# Patient Record
Sex: FEMALE | Race: WHITE | Employment: FULL TIME | URBAN - METROPOLITAN AREA
[De-identification: names, ages, dates, MRNs, and addresses within clinical notes are randomized per-mention and may not be internally consistent; named-entity substitution may affect disease eponyms.]

---

## 2017-11-28 ENCOUNTER — HOSPITAL ENCOUNTER (OUTPATIENT)
Dept: MAMMOGRAPHY | Age: 62
Discharge: HOME | End: 2017-11-28
Attending: OBSTETRICS & GYNECOLOGY
Payer: COMMERCIAL

## 2017-11-28 DIAGNOSIS — Z12.31 ENCOUNTER FOR SCREENING MAMMOGRAM FOR MALIGNANT NEOPLASM OF BREAST: ICD-10-CM

## 2017-11-28 PROCEDURE — 77067 SCR MAMMO BI INCL CAD: CPT

## 2018-12-07 ENCOUNTER — HOSPITAL ENCOUNTER (OUTPATIENT)
Dept: MAMMOGRAPHY | Age: 63
Discharge: HOME | End: 2018-12-07
Attending: OBSTETRICS & GYNECOLOGY
Payer: COMMERCIAL

## 2018-12-07 DIAGNOSIS — R92.2 DENSE BREAST TISSUE ON MAMMOGRAM: ICD-10-CM

## 2018-12-07 DIAGNOSIS — Z12.39 SCREENING FOR BREAST CANCER: ICD-10-CM

## 2018-12-07 PROCEDURE — 77063 BREAST TOMOSYNTHESIS BI: CPT

## 2020-01-14 ENCOUNTER — HOSPITAL ENCOUNTER (OUTPATIENT)
Dept: MAMMOGRAPHY | Age: 65
Discharge: HOME | End: 2020-01-14
Attending: OBSTETRICS & GYNECOLOGY
Payer: COMMERCIAL

## 2020-01-14 DIAGNOSIS — Z12.31 VISIT FOR SCREENING MAMMOGRAM: ICD-10-CM

## 2020-01-14 PROCEDURE — 77063 BREAST TOMOSYNTHESIS BI: CPT

## 2021-01-18 ENCOUNTER — TRANSCRIBE ORDER (OUTPATIENT)
Dept: SCHEDULING | Age: 66
End: 2021-01-18

## 2021-01-18 DIAGNOSIS — Z12.31 SCREENING MAMMOGRAM, ENCOUNTER FOR: Primary | ICD-10-CM

## 2021-02-22 ENCOUNTER — APPOINTMENT (RX ONLY)
Dept: URBAN - METROPOLITAN AREA CLINIC 349 | Facility: CLINIC | Age: 66
Setting detail: DERMATOLOGY
End: 2021-02-22

## 2021-02-22 DIAGNOSIS — D485 NEOPLASM OF UNCERTAIN BEHAVIOR OF SKIN: ICD-10-CM

## 2021-02-22 DIAGNOSIS — L82.1 OTHER SEBORRHEIC KERATOSIS: ICD-10-CM

## 2021-02-22 DIAGNOSIS — L20.89 OTHER ATOPIC DERMATITIS: ICD-10-CM

## 2021-02-22 DIAGNOSIS — D22 MELANOCYTIC NEVI: ICD-10-CM

## 2021-02-22 DIAGNOSIS — D18.0 HEMANGIOMA: ICD-10-CM

## 2021-02-22 DIAGNOSIS — Z71.89 OTHER SPECIFIED COUNSELING: ICD-10-CM

## 2021-02-22 DIAGNOSIS — L81.4 OTHER MELANIN HYPERPIGMENTATION: ICD-10-CM

## 2021-02-22 PROBLEM — D22.5 MELANOCYTIC NEVI OF TRUNK: Status: ACTIVE | Noted: 2021-02-22

## 2021-02-22 PROBLEM — D22.61 MELANOCYTIC NEVI OF RIGHT UPPER LIMB, INCLUDING SHOULDER: Status: ACTIVE | Noted: 2021-02-22

## 2021-02-22 PROBLEM — D18.01 HEMANGIOMA OF SKIN AND SUBCUTANEOUS TISSUE: Status: ACTIVE | Noted: 2021-02-22

## 2021-02-22 PROBLEM — D22.71 MELANOCYTIC NEVI OF RIGHT LOWER LIMB, INCLUDING HIP: Status: ACTIVE | Noted: 2021-02-22

## 2021-02-22 PROBLEM — D22.72 MELANOCYTIC NEVI OF LEFT LOWER LIMB, INCLUDING HIP: Status: ACTIVE | Noted: 2021-02-22

## 2021-02-22 PROBLEM — D48.5 NEOPLASM OF UNCERTAIN BEHAVIOR OF SKIN: Status: ACTIVE | Noted: 2021-02-22

## 2021-02-22 PROBLEM — L20.84 INTRINSIC (ALLERGIC) ECZEMA: Status: ACTIVE | Noted: 2021-02-22

## 2021-02-22 PROCEDURE — ? EDUCATIONAL RESOURCES PROVIDED

## 2021-02-22 PROCEDURE — ? COUNSELING

## 2021-02-22 PROCEDURE — A4550 SURGICAL TRAYS: HCPCS

## 2021-02-22 PROCEDURE — 11102 TANGNTL BX SKIN SINGLE LES: CPT

## 2021-02-22 PROCEDURE — ? BIOPSY BY SHAVE METHOD

## 2021-02-22 PROCEDURE — 99243 OFF/OP CNSLTJ NEW/EST LOW 30: CPT | Mod: 25

## 2021-02-22 PROCEDURE — ? OTHER

## 2021-02-22 PROCEDURE — ? RECOMMENDATIONS

## 2021-02-22 ASSESSMENT — LOCATION SIMPLE DESCRIPTION DERM
LOCATION SIMPLE: RIGHT UPPER ARM
LOCATION SIMPLE: LEFT THIGH
LOCATION SIMPLE: RIGHT CALF
LOCATION SIMPLE: CHEST
LOCATION SIMPLE: LOWER BACK
LOCATION SIMPLE: ABDOMEN
LOCATION SIMPLE: UPPER BACK
LOCATION SIMPLE: LEFT UPPER ARM
LOCATION SIMPLE: LEFT PRETIBIAL REGION
LOCATION SIMPLE: RIGHT THIGH
LOCATION SIMPLE: RIGHT SCALP
LOCATION SIMPLE: LEFT UPPER BACK

## 2021-02-22 ASSESSMENT — LOCATION DETAILED DESCRIPTION DERM
LOCATION DETAILED: LEFT PROXIMAL PRETIBIAL REGION
LOCATION DETAILED: LEFT ANTERIOR PROXIMAL UPPER ARM
LOCATION DETAILED: UPPER STERNUM
LOCATION DETAILED: PERIUMBILICAL SKIN
LOCATION DETAILED: SUPERIOR LUMBAR SPINE
LOCATION DETAILED: RIGHT ANTERIOR PROXIMAL THIGH
LOCATION DETAILED: RIGHT PROXIMAL CALF
LOCATION DETAILED: SUPERIOR THORACIC SPINE
LOCATION DETAILED: LEFT ANTERIOR DISTAL THIGH
LOCATION DETAILED: LEFT MID-UPPER BACK
LOCATION DETAILED: LEFT SUPERIOR MEDIAL UPPER BACK
LOCATION DETAILED: EPIGASTRIC SKIN
LOCATION DETAILED: RIGHT LATERAL SUPERIOR CHEST
LOCATION DETAILED: RIGHT ANTERIOR PROXIMAL UPPER ARM
LOCATION DETAILED: LEFT INFERIOR MEDIAL UPPER BACK
LOCATION DETAILED: RIGHT MEDIAL FRONTAL SCALP

## 2021-02-22 ASSESSMENT — LOCATION ZONE DERM
LOCATION ZONE: LEG
LOCATION ZONE: ARM
LOCATION ZONE: TRUNK
LOCATION ZONE: SCALP

## 2021-02-22 NOTE — HPI: SKIN LESION
How Severe Is Your Skin Lesion?: mild
Has Your Skin Lesion Been Treated?: not been treated
Is This A New Presentation, Or A Follow-Up?: Skin Lesion
Additional History: Patient is here for a skin check. She has a lesion on her forehead that feels scaly but she denies any growing, bleeding or changing. Patient denies family or personal history of melanoma.

## 2021-02-22 NOTE — PROCEDURE: BIOPSY BY SHAVE METHOD
Consent: Written consent was obtained and risks were reviewed including but not limited to scarring, infection, bleeding, scabbing, incomplete removal, nerve damage and allergy to anesthesia.
Bill 54365 For Specimen Handling/Conveyance To Laboratory?: no
Detail Level: Detailed
Dressing: pressure dressing with telfa
Information: Selecting Yes will display possible errors in your note based on the variables you have selected. This validation is only offered as a suggestion for you. PLEASE NOTE THAT THE VALIDATION TEXT WILL BE REMOVED WHEN YOU FINALIZE YOUR NOTE. IF YOU WANT TO FAX A PRELIMINARY NOTE YOU WILL NEED TO TOGGLE THIS TO 'NO' IF YOU DO NOT WANT IT IN YOUR FAXED NOTE.
Anesthesia Volume In Cc (Will Not Render If 0): 0.7
Curettage Text: The wound bed was treated with curettage after the biopsy was performed.
Validate Note Data (See Information Below): Yes
Cryotherapy Text: The wound bed was treated with cryotherapy after the biopsy was performed.
Notification Instructions: Patient will be notified of biopsy results. However, patient instructed to call the office if not contacted within 2 weeks.
Electrodesiccation And Curettage Text: The wound bed was treated with electrodesiccation and curettage after the biopsy was performed.
Accession #: global
Silver Nitrate Text: The wound bed was treated with silver nitrate after the biopsy was performed.
Path Notes (To The Dermatopathologist): Partial shave
Billing Type: Third-Party Bill
Biopsy Type: H and E
Hemostasis: Electrodesiccation
Depth Of Biopsy: dermis
Size Of Lesion In Cm: 0
Electrodesiccation Text: The wound bed was treated with electrodesiccation after the biopsy was performed.
Post-Care Instructions: I reviewed with the patient in detail post-care instructions. Patient is to keep the biopsy site dry overnight, and then apply bacitracin twice daily until healed. Patient may apply hydrogen peroxide soaks to remove any crusting.
Wound Care: Vaseline
Biopsy Method: Dermablade
Anesthesia Type: 2% lidocaine with epinephrine
Type Of Destruction Used: Curettage

## 2021-02-22 NOTE — PROCEDURE: OTHER
Detail Level: Zone
Note Text (......Xxx Chief Complaint.): This diagnosis correlates with the
Render Risk Assessment In Note?: yes
Other (Free Text): Advised to call if it fails to go away in the next few months. Patient is very dry. Doesn’t bother her. Recommended dove bar soap

## 2021-02-24 ENCOUNTER — HOSPITAL ENCOUNTER (OUTPATIENT)
Dept: MAMMOGRAPHY | Age: 66
Discharge: HOME | End: 2021-02-24
Attending: OBSTETRICS & GYNECOLOGY
Payer: COMMERCIAL

## 2021-02-24 DIAGNOSIS — Z12.31 SCREENING MAMMOGRAM, ENCOUNTER FOR: ICD-10-CM

## 2021-02-24 PROCEDURE — 77067 SCR MAMMO BI INCL CAD: CPT

## 2021-03-01 ENCOUNTER — RX ONLY (OUTPATIENT)
Age: 66
Setting detail: RX ONLY
End: 2021-03-01

## 2021-03-01 RX ORDER — CLOBETASOL PROPIONATE 0.5 MG/ML
SOLUTION TOPICAL
Qty: 1 | Refills: 1 | Status: ERX | COMMUNITY
Start: 2021-03-01

## 2021-03-03 ENCOUNTER — HOSPITAL ENCOUNTER (OUTPATIENT)
Dept: MAMMOGRAPHY | Age: 66
Discharge: HOME | End: 2021-03-03
Attending: OBSTETRICS & GYNECOLOGY
Payer: COMMERCIAL

## 2021-03-03 DIAGNOSIS — R92.8 ABNORMAL SCREENING MAMMOGRAM: ICD-10-CM

## 2021-03-03 PROCEDURE — 76642 ULTRASOUND BREAST LIMITED: CPT

## 2021-03-03 PROCEDURE — 77061 BREAST TOMOSYNTHESIS UNI: CPT

## 2021-03-15 ENCOUNTER — HOSPITAL ENCOUNTER (OUTPATIENT)
Dept: MRI IMAGING | Age: 66
Discharge: HOME | End: 2021-03-15
Attending: OBSTETRICS & GYNECOLOGY
Payer: COMMERCIAL

## 2021-03-15 DIAGNOSIS — R92.8 ABNORMAL MAMMOGRAM: ICD-10-CM

## 2021-03-15 DIAGNOSIS — R92.8 ABNORMAL ULTRASOUND OF BREAST: ICD-10-CM

## 2021-03-15 DIAGNOSIS — Z80.3 FAMILY HISTORY OF BREAST CANCER: ICD-10-CM

## 2021-03-15 PROCEDURE — 77049 MRI BREAST C-+ W/CAD BI: CPT

## 2021-03-15 PROCEDURE — A9575 INJ GADOTERATE MEGLUMI 0.1ML: HCPCS | Performed by: OBSTETRICS & GYNECOLOGY

## 2021-03-15 PROCEDURE — 74011000258 HC RX REV CODE- 258: Performed by: OBSTETRICS & GYNECOLOGY

## 2021-03-15 PROCEDURE — 74011250636 HC RX REV CODE- 250/636: Performed by: OBSTETRICS & GYNECOLOGY

## 2021-03-15 RX ORDER — GADOTERATE MEGLUMINE 376.9 MG/ML
18 INJECTION INTRAVENOUS
Status: COMPLETED | OUTPATIENT
Start: 2021-03-15 | End: 2021-03-15

## 2021-03-15 RX ORDER — SODIUM CHLORIDE 0.9 % (FLUSH) 0.9 %
10 SYRINGE (ML) INJECTION
Status: COMPLETED | OUTPATIENT
Start: 2021-03-15 | End: 2021-03-15

## 2021-03-15 RX ADMIN — SODIUM CHLORIDE 100 ML: 900 INJECTION, SOLUTION INTRAVENOUS at 11:57

## 2021-03-15 RX ADMIN — GADOTERATE MEGLUMINE 18 ML: 376.9 INJECTION INTRAVENOUS at 11:57

## 2021-03-15 RX ADMIN — Medication 10 ML: at 11:57

## 2021-03-18 ENCOUNTER — HOSPITAL ENCOUNTER (OUTPATIENT)
Dept: MAMMOGRAPHY | Age: 66
Discharge: HOME | End: 2021-03-18
Attending: OBSTETRICS & GYNECOLOGY
Payer: COMMERCIAL

## 2021-03-18 VITALS
SYSTOLIC BLOOD PRESSURE: 112 MMHG | HEART RATE: 79 BPM | TEMPERATURE: 96.6 F | DIASTOLIC BLOOD PRESSURE: 56 MMHG | OXYGEN SATURATION: 97 %

## 2021-03-18 DIAGNOSIS — R92.8 ABNORMAL MRI, BREAST: ICD-10-CM

## 2021-03-18 PROCEDURE — 74011000250 HC RX REV CODE- 250: Performed by: OBSTETRICS & GYNECOLOGY

## 2021-03-18 PROCEDURE — 77030022584 MAM STEREO VAC  BX BREAST LT 1ST LESION W/CLIP AND SPECIMEN

## 2021-03-18 PROCEDURE — 77065 DX MAMMO INCL CAD UNI: CPT

## 2021-03-18 PROCEDURE — 74011250636 HC RX REV CODE- 250/636: Performed by: OBSTETRICS & GYNECOLOGY

## 2021-03-18 PROCEDURE — A4648 IMPLANTABLE TISSUE MARKER: HCPCS

## 2021-03-18 PROCEDURE — 88305 TISSUE EXAM BY PATHOLOGIST: CPT

## 2021-03-18 RX ORDER — LIDOCAINE HYDROCHLORIDE AND EPINEPHRINE 10; 10 MG/ML; UG/ML
10 INJECTION, SOLUTION INFILTRATION; PERINEURAL
Status: COMPLETED | OUTPATIENT
Start: 2021-03-18 | End: 2021-03-18

## 2021-03-18 RX ORDER — LIDOCAINE HYDROCHLORIDE 10 MG/ML
5 INJECTION INFILTRATION; PERINEURAL
Status: COMPLETED | OUTPATIENT
Start: 2021-03-18 | End: 2021-03-18

## 2021-03-18 RX ADMIN — LIDOCAINE HYDROCHLORIDE,EPINEPHRINE BITARTRATE 2 ML: 10; .01 INJECTION, SOLUTION INFILTRATION; PERINEURAL at 11:03

## 2021-03-18 RX ADMIN — LIDOCAINE HYDROCHLORIDE 5 ML: 10 INJECTION, SOLUTION INFILTRATION; PERINEURAL at 11:03

## 2021-03-18 RX ADMIN — SODIUM CHLORIDE 250 ML: 900 INJECTION, SOLUTION INTRAVENOUS at 11:03

## 2021-03-28 PROBLEM — C50.912 MALIGNANT NEOPLASM OF LEFT BREAST IN FEMALE, ESTROGEN RECEPTOR POSITIVE (HCC): Status: ACTIVE | Noted: 2021-03-28

## 2021-03-28 PROBLEM — Z17.0 MALIGNANT NEOPLASM OF LEFT BREAST IN FEMALE, ESTROGEN RECEPTOR POSITIVE (HCC): Status: ACTIVE | Noted: 2021-03-28

## 2021-03-28 NOTE — H&P (VIEW-ONLY)
[address] [facility], [address] [facility] 
[phone] History and Physical  
Abdi Rosales    Admit date: (Not on file) MRN: [id number]     : 1955     Age: 63 y.o. Attending Physician: Dimitri Sosa MD 
 
HPI: Abdi Rosales is a 63 y.o. female who was found to have a new lesion on mammogram, left breast in retroareolar location. Pt has no prior breast history There is no significant family history of breast cancer. Pt denies any new findings on self exam 
 
 
Documentation checklist  (yes/no/na) 1. Was patient presented at interdisciplinary breast conference pre-op? Yes 2. For mastectomy patients---Was plastic surgery consultation offered/obtained pre-op  No pt will have lumpectomy 3. Is patient having sentinel node excision? (If no, and pt had invasive carcinoma, please explain)? yes 4. Was pt referred for genetic counseling?   no 
 
5. What was the genetic risk screening score? <10 Family History 10 pts 1st degree relative or 2 second degree relatives meeting above criteria (on the same side of the family) 10 pts 3rd degree relative with breast cancer and 2 or more close blood relatives with breast cancer (1-< or = 50 yrs) 9 pts  Close male relative with breast cancer at any age 10 pts Family member with known BRCA1 or BRCA2 mutation 7 pts Close relative with ovarian cancer at any age 7 pts Ashkenazi Buddhist ancestry 7 pts  Specific  populations ([place], American [place], [place]) Definitions: 
1st degree relative: parent, sister/brother, child 2nd degree relative:  grandparent, aunt/uncle, half-sibling, niece/nephew 3rd degree relative: great grandparent, great-uncle/aunt, cousin Close relative: first, second, or 3rd degree relative Past Medical History:  
Diagnosis Date  Abnormal Pap smear  Chicken pox  Cyst of breast   
 Left and right breast cysts.  Measles  Mumps No current outpatient medications on file. No current facility-administered medications for this visit. ALERGIES:  Patient has no known allergies. Social  
Social History Tobacco Use  Smoking status: Never Smoker  Smokeless tobacco: Never Used Substance Use Topics  Alcohol use: Yes Alcohol/week: 1.0 standard drinks Types: 1 Glasses of wine per week Comment: social  
 Drug use: No  
 
Family History Problem Relation Age of Onset  Breast Cancer Mother 62  Osteoporosis Other  Diabetes Maternal Grandfather  Heart Disease Paternal Grandfather  Hypertension Paternal Grandfather  Colon Cancer Neg Hx   
 Ovarian Cancer Neg Hx Past Surgical History:  
Procedure Laterality Date  CKC, AKA COLD KNIFE CONE    
 around 2003  HX BREAST BIOPSY Left 03/18/2021  
 left retroareolar assymetry ROS: The patient has no difficulty with chest pain or shortness of breath. No fever or chills. Comprehensive review of systems was otherwise unremarkable except as noted above. Physical Exam:  
General: Alert oriented cooperative patient in no acute distress. Eyes:Sclera are clear. Resp: No JVD. Breathing is  non-labored. CV: RRR. Breast:normal physical exam  
Axillary: negative for enlarged nodes Abd: soft non-tender and non-distended without peritoneal signs. Extremities: unremarkable. Neuro:  No focal signs CBC:  
Lab Results Component Value Date/Time Hemoglobin (POC) 13.5 10/23/2019 01:02 PM  
 
BMP: No results found for: NA, K, CL, CO2, AGAP, GLU, BUN, CREA, BUCR, GFRAA, GFRNA, CA, GFRAA  
 
[facility] Results (most recent): 
Results from Hospital Encounter encounter on 03/18/21 PATRICIA POST BX IMAGING LT INCL CAD Narrative ADDENDUM 3/24/2021: 
 
Pathology was noted as A: Left retroareolar architectural distortion, 3 cm from 
nipple, core biopsy: Infiltrating duct carcinoma with lobular features, low 
grade (well differentiated).   Focal lobular carcinoma in situ. Definite 
lymphovascular invasion is not identified. Results concordant with imaging. Pathology report and recommendation were 
called to  Dr. Mathieu Buitrago office on 3/19/2021 by BRIAN Hernandez. Patient was referred to Dr. Nikhil Gastelum and has an appointment scheduled 
with him on 3/25/2021. Follow-up breast MRI is also recommended in 12 months 
(see below). LEFT BREAST STEREOTACTIC VACUUM-ASSISTED CORE NEEDLE BIOPSY AND MARKER CLIP 
PLACEMENT,  
LEFT DIAGNOSTIC DIGITAL MAMMOGRAPHY: 
 
CLINICAL HISTORY:  Followup abnormal mammogram and MRI for histologic diagnosis 
in a 24-year-old with a strong family history of breast cancer give her a 
persistent 27% lifetime breast cancer risk. BIOPSY AND CLIP PLACEMENT:  Written informed consent was obtained. Focal 
architectural distortion at the retroareolar position 3.5 cm from the nipple 
confirmed at mammography on March 3, 2021 and shown to be associated with a 1 cm 
multilobular enhancing mass by MRI on March 15, 2021 was localized with 
craniocaudal compression from above on the Hologic prone stereotactic biopsy 
table. Using standard aseptic technique and 1% Xylocaine local anesthesia, a 
8-gauge Mammotome Revolve vacuum-assisted core biopsy needle was positioned 
adjacent to the architectural distortion, and 8 core samples were obtained. A 
titanium marker clip was placed through the cannula, and deployment was 
confirmed by repeat stereotactic imaging. POST-BIOPSY LEFT MAMMOGRAM:  Craniocaudal and straight lateral views demonstrate 
the biopsy marker clip in expected position. The patient tolerated the procedure 
well without immediate complication and left the department in good condition. Impression 1. Successful core needle biopsy and marker clip placement for architectural 
distortion at the retroareolar position.  
 
2. If histology fails to confirm malignancy, then followup MRI is recommended in 6-12 months per the percutaneous core needle biopsy protocol. 3.  If histology confirms malignancy, then follow-up breast MRI is again 
recommended in 12 months for follow-up of extensive bilateral background 
parenchymal enhancement. Thank you for referral of this patient. MRI Results (most recent): 
Results from Hospital Encounter encounter on 03/15/21 MRI BREAST BI W WO CONT Narrative BREAST MRI BEFORE AND AFTER CONTRAST CLINICAL HISTORY:  Follow-up indeterminate mammography and ultrasound in a 
28-year-old with dense breast parenchyma and a strong family history of breast 
cancer giving her a persistent lifetime risk of 27% by the Tyrer-Cuzick model. TECHNIQUE: Standard axial and sagittal images were obtained before and during 
bolus injection of 18 cc of Dotarem IV. CAD was employed. COMPARISON:  Bilateral 3-D screening mammogram of February 24, 2021, left 3-D 
additional views and ultrasound of March 3, 2021, and earlier studies. FINDINGS: There is extensive background parenchymal enhancement bilaterally, 
which limits MRI detection of possible small malignant foci. There are multiple 
small bilateral cysts suggestive of fibrocystic mastopathy. There is 1.0 cm 
multilobular enhancing mass with suspicious kinetics corresponding to the focus 
of left retroareolar architectural distortion approximately 3.5 cm posterior to 
the nipple. No pathologically enlarged or dysmorphic lymph nodes are seen, 
accounting for mammographic stability since 2015 of a 3 cm fatty-replaced left 
axillary lymph node with lobular inferior cortical thickening. The liver, 
lungs, and chest wall appear unremarkable as imaged. Impression 1. A 1 CM MULTILOBULAR RETROAREOLAR MASS WITH WORRISOME KINETICS ASSOCIATED 
WITH MAMMOGRAPHIC ARCHITECTURAL DISTORTION IS SUSPICIOUS FOR MALIGNANCY, AND 
STEREOTACTIC BIOPSY IS RECOMMENDED (IN THE ABSENCE OF A SONOGRAPHIC CORRELATE).   
 
 
2.  NO MR EVIDENCE OF RIGHT BREAST MALIGNANCY OR OF METASTATIC DISEASE IN THE IMAGED CHEST. 2.  EXTENSIVE BILATERAL BACKGROUND PARENCHYMAL ENHANCEMENT FOR  WHICH FOLLOW-UP 
BREAST MRI IS RECOMMENDED IN ONE YEAR FOR THIS 63YEAR-OLD WITH DENSE, COMPLEX 
BREAST PARENCHYMA AND A PERSISTENT 27% LIFETIME BREAST CANCER RISK (SEE ABOVE). LEFT: BI-RADS Assessment Category 4: Suspicious Finding- Biopsy should be 
considered. BILATERAL BACKGROUND PARENCHYMAL ENHANCEMENT: BI-RADS Assessment Category 3: 
Probably benign- Short-interval follow-up suggested (one year). Preliminary results were reported by Nelson Carbone to [facility] in Dr. Zavala Shown 
office at on March 16, 2021 09:08 hours. The patient has been scheduled for 
left breast biopsy with stereotactic guidance on March 18, 2020 at 10:15 hours. PET Results (most recent): No results found for this or any previous visit. Assessment/Plan: Grace Genao is a 63 y.o. female who has newly diagnosed left breast cancer. We discussed in detail the findings on imaging and pathology. We discussed treatment options including surgery, oncology and radiation therapy. I recommended the following Wire localized left breast lumpectomy and sentinel lymph node biopsy I spent 40 minutes in the care of the Grace Genao today, over 50% of which was in direct counseling and coordination of care. Patient to be presented at [facility] breast conference.   
 
Signed:  Neo Porter MD 
3/58/6090  1:54 PM

## 2021-04-08 VITALS — HEIGHT: 68 IN | WEIGHT: 190 LBS | BODY MASS INDEX: 28.79 KG/M2

## 2021-04-08 NOTE — PERIOP NOTES
Patient verified name and . Order for consent found in EHR and matches case posting; patient verifies procedure. Type 1B surgery, PAT PHONE assessment complete. Orders received. Labs per surgeon: none. Labs per anesthesia protocol: none. Patient COVID test date 21 at [phone]; patient is aware of appointment. The testing center is located at the [address], [place]. If appointment is needed-patient provided telephone number of [phone]. Patient answered medical/surgical history questions at their best of ability. All prior to admission medications documented in [facility]. Patient instructed to take the following medications the day of surgery according to anesthesia guidelines with a small sip of water: NONE. Hold all vitamins, supplements, herbals 7 days prior to surgery and NSAIDS/ASA 5 days prior to surgery. Patient instructed on the following:      > No deodorant  > Arrive at [facility], time of arrival to be called the day before by 1700  > NPO after midnight including gum, mints, and ice chips  > Responsible adult must drive patient to the hospital, stay during surgery, and patient will need supervision 24 hours after anesthesia  > Use anti-bacterial soap in shower the night before surgery and on the morning of surgery  > All piercings must be removed prior to arrival.    > Leave all valuables (money and jewelry) at home but bring insurance card and ID on DOS.   > Do not wear make-up, nail polish, lotions, cologne, perfumes, powders, or oil on skin.

## 2021-04-09 ENCOUNTER — HOSPITAL ENCOUNTER (OUTPATIENT)
Dept: SURGERY | Age: 66
Discharge: HOME | End: 2021-04-09

## 2021-04-15 ENCOUNTER — ANESTHESIA EVENT (OUTPATIENT)
Dept: SURGERY | Age: 66
End: 2021-04-15
Payer: COMMERCIAL

## 2021-04-16 ENCOUNTER — APPOINTMENT (OUTPATIENT)
Dept: MAMMOGRAPHY | Age: 66
End: 2021-04-16
Attending: OBSTETRICS & GYNECOLOGY
Payer: COMMERCIAL

## 2021-04-16 ENCOUNTER — APPOINTMENT (OUTPATIENT)
Dept: MAMMOGRAPHY | Age: 66
End: 2021-04-16
Attending: SURGERY
Payer: COMMERCIAL

## 2021-04-16 ENCOUNTER — ANESTHESIA (OUTPATIENT)
Dept: SURGERY | Age: 66
End: 2021-04-16
Payer: COMMERCIAL

## 2021-04-16 ENCOUNTER — HOSPITAL ENCOUNTER (OUTPATIENT)
Age: 66
Setting detail: OUTPATIENT SURGERY
Discharge: HOME | End: 2021-04-16
Attending: SURGERY | Admitting: SURGERY
Payer: COMMERCIAL

## 2021-04-16 ENCOUNTER — APPOINTMENT (OUTPATIENT)
Dept: NUCLEAR MEDICINE | Age: 66
End: 2021-04-16
Attending: SURGERY
Payer: COMMERCIAL

## 2021-04-16 VITALS
DIASTOLIC BLOOD PRESSURE: 64 MMHG | BODY MASS INDEX: 28.65 KG/M2 | SYSTOLIC BLOOD PRESSURE: 111 MMHG | TEMPERATURE: 97.9 F | OXYGEN SATURATION: 95 % | HEART RATE: 69 BPM | WEIGHT: 188.4 LBS | RESPIRATION RATE: 16 BRPM

## 2021-04-16 DIAGNOSIS — Z17.0 MALIGNANT NEOPLASM OF LEFT BREAST IN FEMALE, ESTROGEN RECEPTOR POSITIVE, UNSPECIFIED SITE OF BREAST (HCC): Primary | ICD-10-CM

## 2021-04-16 DIAGNOSIS — C50.912 MALIGNANT NEOPLASM OF LEFT BREAST IN FEMALE, ESTROGEN RECEPTOR POSITIVE, UNSPECIFIED SITE OF BREAST (HCC): Primary | ICD-10-CM

## 2021-04-16 PROCEDURE — 77030040361 HC SLV COMPR DVT MDII -B: Performed by: SURGERY

## 2021-04-16 PROCEDURE — 77030031139 HC SUT VCRL2 J&J -A: Performed by: SURGERY

## 2021-04-16 PROCEDURE — 74011250636 HC RX REV CODE- 250/636: Performed by: NURSE ANESTHETIST, CERTIFIED REGISTERED

## 2021-04-16 PROCEDURE — 77030010512 HC APPL CLP LIG J&J -C: Performed by: SURGERY

## 2021-04-16 PROCEDURE — 2709999900 HC NON-CHARGEABLE SUPPLY: Performed by: SURGERY

## 2021-04-16 PROCEDURE — 76060000034 HC ANESTHESIA 1.5 TO 2 HR: Performed by: SURGERY

## 2021-04-16 PROCEDURE — 76210000020 HC REC RM PH II FIRST 0.5 HR: Performed by: SURGERY

## 2021-04-16 PROCEDURE — 74011000250 HC RX REV CODE- 250: Performed by: SURGERY

## 2021-04-16 PROCEDURE — 76010000162 HC OR TIME 1.5 TO 2 HR INTENSV-TIER 1: Performed by: SURGERY

## 2021-04-16 PROCEDURE — 77030010509 HC AIRWY LMA MSK TELE -A: Performed by: NURSE ANESTHETIST, CERTIFIED REGISTERED

## 2021-04-16 PROCEDURE — 74011000250 HC RX REV CODE- 250: Performed by: NURSE ANESTHETIST, CERTIFIED REGISTERED

## 2021-04-16 PROCEDURE — A9541 TC99M SULFUR COLLOID: HCPCS

## 2021-04-16 PROCEDURE — 74011250637 HC RX REV CODE- 250/637: Performed by: ANESTHESIOLOGY

## 2021-04-16 PROCEDURE — 88305 TISSUE EXAM BY PATHOLOGIST: CPT

## 2021-04-16 PROCEDURE — 74011250636 HC RX REV CODE- 250/636: Performed by: ANESTHESIOLOGY

## 2021-04-16 PROCEDURE — 77030002933 HC SUT MCRYL J&J -A: Performed by: SURGERY

## 2021-04-16 PROCEDURE — 38525 BIOPSY/REMOVAL LYMPH NODES: CPT | Performed by: SURGERY

## 2021-04-16 PROCEDURE — 77030002996 HC SUT SLK J&J -A: Performed by: SURGERY

## 2021-04-16 PROCEDURE — 88307 TISSUE EXAM BY PATHOLOGIST: CPT

## 2021-04-16 PROCEDURE — 19281 PERQ DEVICE BREAST 1ST IMAG: CPT

## 2021-04-16 PROCEDURE — 76210000006 HC OR PH I REC 0.5 TO 1 HR: Performed by: SURGERY

## 2021-04-16 PROCEDURE — 19301 PARTIAL MASTECTOMY: CPT | Performed by: SURGERY

## 2021-04-16 PROCEDURE — 74011250636 HC RX REV CODE- 250/636: Performed by: SURGERY

## 2021-04-16 RX ORDER — SODIUM CHLORIDE 0.9 % (FLUSH) 0.9 %
5-40 SYRINGE (ML) INJECTION AS NEEDED
Status: DISCONTINUED | OUTPATIENT
Start: 2021-04-16 | End: 2021-04-16 | Stop reason: HOSPADM

## 2021-04-16 RX ORDER — LIDOCAINE HYDROCHLORIDE 10 MG/ML
0.1 INJECTION INFILTRATION; PERINEURAL AS NEEDED
Status: DISCONTINUED | OUTPATIENT
Start: 2021-04-16 | End: 2021-04-16 | Stop reason: HOSPADM

## 2021-04-16 RX ORDER — NALOXONE HYDROCHLORIDE 0.4 MG/ML
0.2 INJECTION, SOLUTION INTRAMUSCULAR; INTRAVENOUS; SUBCUTANEOUS AS NEEDED
Status: DISCONTINUED | OUTPATIENT
Start: 2021-04-16 | End: 2021-04-16 | Stop reason: HOSPADM

## 2021-04-16 RX ORDER — SODIUM CHLORIDE 0.9 % (FLUSH) 0.9 %
5-40 SYRINGE (ML) INJECTION EVERY 8 HOURS
Status: DISCONTINUED | OUTPATIENT
Start: 2021-04-16 | End: 2021-04-16 | Stop reason: HOSPADM

## 2021-04-16 RX ORDER — EPHEDRINE SULFATE/0.9% NACL/PF 50 MG/5 ML
SYRINGE (ML) INTRAVENOUS AS NEEDED
Status: DISCONTINUED | OUTPATIENT
Start: 2021-04-16 | End: 2021-04-16 | Stop reason: HOSPADM

## 2021-04-16 RX ORDER — SODIUM CHLORIDE, SODIUM LACTATE, POTASSIUM CHLORIDE, CALCIUM CHLORIDE 600; 310; 30; 20 MG/100ML; MG/100ML; MG/100ML; MG/100ML
100 INJECTION, SOLUTION INTRAVENOUS CONTINUOUS
Status: DISCONTINUED | OUTPATIENT
Start: 2021-04-16 | End: 2021-04-16 | Stop reason: HOSPADM

## 2021-04-16 RX ORDER — FENTANYL CITRATE 50 UG/ML
100 INJECTION, SOLUTION INTRAMUSCULAR; INTRAVENOUS ONCE
Status: DISCONTINUED | OUTPATIENT
Start: 2021-04-16 | End: 2021-04-16 | Stop reason: HOSPADM

## 2021-04-16 RX ORDER — TRAMADOL HYDROCHLORIDE 50 MG/1
50 TABLET ORAL
Qty: 28 TAB | Refills: 0 | Status: SHIPPED | OUTPATIENT
Start: 2021-04-16 | End: 2021-04-23

## 2021-04-16 RX ORDER — ONDANSETRON 2 MG/ML
INJECTION INTRAMUSCULAR; INTRAVENOUS AS NEEDED
Status: DISCONTINUED | OUTPATIENT
Start: 2021-04-16 | End: 2021-04-16 | Stop reason: HOSPADM

## 2021-04-16 RX ORDER — DEXAMETHASONE SODIUM PHOSPHATE 4 MG/ML
INJECTION, SOLUTION INTRA-ARTICULAR; INTRALESIONAL; INTRAMUSCULAR; INTRAVENOUS; SOFT TISSUE AS NEEDED
Status: DISCONTINUED | OUTPATIENT
Start: 2021-04-16 | End: 2021-04-16 | Stop reason: HOSPADM

## 2021-04-16 RX ORDER — KETOROLAC TROMETHAMINE 30 MG/ML
INJECTION, SOLUTION INTRAMUSCULAR; INTRAVENOUS AS NEEDED
Status: DISCONTINUED | OUTPATIENT
Start: 2021-04-16 | End: 2021-04-16 | Stop reason: HOSPADM

## 2021-04-16 RX ORDER — MIDAZOLAM HYDROCHLORIDE 1 MG/ML
2 INJECTION, SOLUTION INTRAMUSCULAR; INTRAVENOUS
Status: DISCONTINUED | OUTPATIENT
Start: 2021-04-16 | End: 2021-04-16 | Stop reason: HOSPADM

## 2021-04-16 RX ORDER — FENTANYL CITRATE 50 UG/ML
INJECTION, SOLUTION INTRAMUSCULAR; INTRAVENOUS AS NEEDED
Status: DISCONTINUED | OUTPATIENT
Start: 2021-04-16 | End: 2021-04-16 | Stop reason: HOSPADM

## 2021-04-16 RX ORDER — FLUMAZENIL 0.1 MG/ML
0.2 INJECTION INTRAVENOUS
Status: DISCONTINUED | OUTPATIENT
Start: 2021-04-16 | End: 2021-04-16 | Stop reason: HOSPADM

## 2021-04-16 RX ORDER — PROPOFOL 10 MG/ML
INJECTION, EMULSION INTRAVENOUS AS NEEDED
Status: DISCONTINUED | OUTPATIENT
Start: 2021-04-16 | End: 2021-04-16 | Stop reason: HOSPADM

## 2021-04-16 RX ORDER — LIDOCAINE HYDROCHLORIDE 10 MG/ML
INJECTION INFILTRATION; PERINEURAL AS NEEDED
Status: DISCONTINUED | OUTPATIENT
Start: 2021-04-16 | End: 2021-04-16 | Stop reason: HOSPADM

## 2021-04-16 RX ORDER — LIDOCAINE HYDROCHLORIDE 10 MG/ML
5 INJECTION INFILTRATION; PERINEURAL
Status: COMPLETED | OUTPATIENT
Start: 2021-04-16 | End: 2021-04-16

## 2021-04-16 RX ORDER — OXYCODONE HYDROCHLORIDE 5 MG/1
10 TABLET ORAL
Status: DISCONTINUED | OUTPATIENT
Start: 2021-04-16 | End: 2021-04-16 | Stop reason: HOSPADM

## 2021-04-16 RX ORDER — OXYCODONE HYDROCHLORIDE 5 MG/1
5 TABLET ORAL
Status: COMPLETED | OUTPATIENT
Start: 2021-04-16 | End: 2021-04-16

## 2021-04-16 RX ORDER — DIPHENHYDRAMINE HYDROCHLORIDE 50 MG/ML
12.5 INJECTION, SOLUTION INTRAMUSCULAR; INTRAVENOUS
Status: DISCONTINUED | OUTPATIENT
Start: 2021-04-16 | End: 2021-04-16 | Stop reason: HOSPADM

## 2021-04-16 RX ORDER — ACETAMINOPHEN 500 MG
1000 TABLET ORAL ONCE
Status: DISCONTINUED | OUTPATIENT
Start: 2021-04-16 | End: 2021-04-16 | Stop reason: HOSPADM

## 2021-04-16 RX ORDER — MIDAZOLAM HYDROCHLORIDE 1 MG/ML
2 INJECTION, SOLUTION INTRAMUSCULAR; INTRAVENOUS ONCE
Status: COMPLETED | OUTPATIENT
Start: 2021-04-16 | End: 2021-04-16

## 2021-04-16 RX ORDER — HYDROMORPHONE HYDROCHLORIDE 2 MG/ML
0.5 INJECTION, SOLUTION INTRAMUSCULAR; INTRAVENOUS; SUBCUTANEOUS
Status: DISCONTINUED | OUTPATIENT
Start: 2021-04-16 | End: 2021-04-16 | Stop reason: HOSPADM

## 2021-04-16 RX ORDER — CEFAZOLIN SODIUM/WATER 2 G/20 ML
2 SYRINGE (ML) INTRAVENOUS ONCE
Status: COMPLETED | OUTPATIENT
Start: 2021-04-16 | End: 2021-04-16

## 2021-04-16 RX ORDER — LIDOCAINE HYDROCHLORIDE 20 MG/ML
INJECTION, SOLUTION EPIDURAL; INFILTRATION; INTRACAUDAL; PERINEURAL AS NEEDED
Status: DISCONTINUED | OUTPATIENT
Start: 2021-04-16 | End: 2021-04-16 | Stop reason: HOSPADM

## 2021-04-16 RX ADMIN — KETOROLAC TROMETHAMINE 15 MG: 30 INJECTION, SOLUTION INTRAMUSCULAR at 14:11

## 2021-04-16 RX ADMIN — OXYCODONE HYDROCHLORIDE 5 MG: 5 TABLET ORAL at 15:22

## 2021-04-16 RX ADMIN — FENTANYL CITRATE 25 MCG: 50 INJECTION INTRAMUSCULAR; INTRAVENOUS at 14:08

## 2021-04-16 RX ADMIN — LIDOCAINE HYDROCHLORIDE 40 MG: 20 INJECTION, SOLUTION EPIDURAL; INFILTRATION; INTRACAUDAL; PERINEURAL at 13:29

## 2021-04-16 RX ADMIN — DEXAMETHASONE SODIUM PHOSPHATE 4 MG: 4 INJECTION, SOLUTION INTRAMUSCULAR; INTRAVENOUS at 13:36

## 2021-04-16 RX ADMIN — MIDAZOLAM HYDROCHLORIDE 2 MG: 1 INJECTION, SOLUTION INTRAMUSCULAR; INTRAVENOUS at 12:29

## 2021-04-16 RX ADMIN — ONDANSETRON 4 MG: 2 INJECTION INTRAMUSCULAR; INTRAVENOUS at 13:36

## 2021-04-16 RX ADMIN — SODIUM CHLORIDE, POTASSIUM CHLORIDE, SODIUM LACTATE AND CALCIUM CHLORIDE 100 ML/HR: 600; 310; 30; 20 INJECTION, SOLUTION INTRAVENOUS at 12:31

## 2021-04-16 RX ADMIN — CEFAZOLIN 2 G: 1 INJECTION, POWDER, FOR SOLUTION INTRAVENOUS at 13:11

## 2021-04-16 RX ADMIN — FENTANYL CITRATE 25 MCG: 50 INJECTION INTRAMUSCULAR; INTRAVENOUS at 13:36

## 2021-04-16 RX ADMIN — Medication 5 MG: at 13:43

## 2021-04-16 RX ADMIN — PROPOFOL 150 MG: 10 INJECTION, EMULSION INTRAVENOUS at 13:29

## 2021-04-16 RX ADMIN — FENTANYL CITRATE 25 MCG: 50 INJECTION INTRAMUSCULAR; INTRAVENOUS at 13:32

## 2021-04-16 RX ADMIN — FENTANYL CITRATE 25 MCG: 50 INJECTION INTRAMUSCULAR; INTRAVENOUS at 13:48

## 2021-04-16 RX ADMIN — LIDOCAINE HYDROCHLORIDE 4 ML: 10 INJECTION, SOLUTION INFILTRATION; PERINEURAL at 11:38

## 2021-04-16 NOTE — ANESTHESIA PREPROCEDURE EVALUATION
Relevant Problems   PERSONAL HX & FAMILY HX OF CANCER   (+) Malignant neoplasm of left breast in female, estrogen receptor positive (HCC)       Anesthetic History   No history of anesthetic complications            Review of Systems / Medical History  Patient summary reviewed and pertinent labs reviewed    Pulmonary  Within defined limits                 Neuro/Psych   Within defined limits           Cardiovascular                  Exercise tolerance: >4 METS     GI/Hepatic/Renal  Within defined limits              Endo/Other  Within defined limits           Other Findings              Physical Exam    Airway  Mallampati: I  TM Distance: 4 - 6 cm  Neck ROM: normal range of motion   Mouth opening: Normal     Cardiovascular    Rhythm: regular  Rate: normal         Dental         Pulmonary  Breath sounds clear to auscultation               Abdominal         Other Findings            Anesthetic Plan    ASA: 1  Anesthesia type: general          Induction: Intravenous  Anesthetic plan and risks discussed with: Patient

## 2021-04-16 NOTE — ANESTHESIA POSTPROCEDURE EVALUATION
Procedure(s):  LEFT BREAST NEEDLE LOCALIZED LUMPECTOMY  LEFT SENTINEL NODE BIOPSY WITH LYMPHATIC MAPPING.     general    Anesthesia Post Evaluation      Multimodal analgesia: multimodal analgesia used between 6 hours prior to anesthesia start to PACU discharge  Patient location during evaluation: PACU  Patient participation: complete - patient participated  Level of consciousness: awake and alert  Pain management: adequate  Airway patency: patent  Anesthetic complications: no  Cardiovascular status: acceptable  Respiratory status: acceptable  Hydration status: acceptable  Post anesthesia nausea and vomiting:  controlled  Final Post Anesthesia Temperature Assessment:  Normothermia (36.0-37.5 degrees C)      INITIAL Post-op Vital signs:   Vitals Value Taken Time   /64 04/16/21 1525   Temp 36.6 °C (97.9 °F) 04/16/21 1448   Pulse 69 04/16/21 1525   Resp 16 04/16/21 1525   SpO2 95 % 04/16/21 1525

## 2021-04-16 NOTE — DISCHARGE INSTRUCTIONS
May shower on Sunday     Ice packs as needed for comfort     Ultram or tylenol as needed for pain       Patient Education        [place] Node Biopsy: What to Expect at [address] [place] [address]  After a sentinel node biopsy, many people have no side effects. Some people have pain or bruising at the cut (incision) and feel tired. Your breast and underarm area may be slightly swollen. This may last a few days. You should feel close to normal in a few days. The incision the doctor made usually heals in about 2 weeks. The scar usually fades with time. Some people have a buildup of fluid in the area where the lymph nodes were removed. This is known as seroma. This goes away on its own, or your doctor can drain it. When you had this test, your doctor injected blue dye or radioactive material (or both) into your breast. The blue dye may give your breast a bluish color and turn your urine green for about 24 hours. The radioactive material leaves the body on its own in 24 to 48 hours. A sentinel node biopsy may be done at the same time as other breast surgeries. If this is the case, how you recover will be different. This care sheet gives you a general idea about how long it will take for you to recover. But each person recovers at a different pace. Follow the steps below to get better as quickly as possible. How can you care for yourself at home? Activity    · Rest when you feel tired. Getting enough sleep will help you recover.     · Try to walk each day. Start by walking a little more than you did the day before. Bit by bit, increase the amount you walk. Walking boosts blood flow and helps prevent pneumonia and constipation.     · You may drive when you are no longer taking pain medicine and you feel up to it.     · You can lift things when you feel comfortable doing so.     · Most women return to work and their normal routines in 2 to 7 days.     · You may shower 24 to 48 hours after surgery, if your doctor okays it. Pat the incision dry. Do not take a bath for the first 2 weeks, or until your doctor tells you it is okay.     · Avoid activity or exercise that may put stress on the cut. This includes washing windows, vacuuming, or gardening with the affected arm. Diet    · You can eat your normal diet. If your stomach is upset, try bland, low-fat foods like plain rice, broiled chicken, toast, and yogurt.     · You may notice that your bowels are not regular right after your surgery. This is common. Try to avoid constipation and straining with bowel movements. Take a fiber supplement such as Citrucel or Metamucil every day. If you have not had a bowel movement after a couple of days, take a mild laxative. Medicines    · Your doctor will tell you if and when you can restart your medicines. He or she will also give you instructions about taking any new medicines.     · If you take aspirin or some other blood thinner, ask your doctor if and when to start taking it again. Make sure that you understand exactly what your doctor wants you to do.     · Take pain medicines exactly as directed. ? If the doctor gave you a prescription medicine for pain, take it as prescribed. ? If you are not taking a prescription pain medicine, take an over-the-counter medicine such as acetaminophen (Tylenol), ibuprofen (Advil, Motrin), or naproxen (Aleve). Read and follow all instructions on the label. ? Do not take two or more pain medicines at the same time unless the doctor told you to. Many pain medicines have acetaminophen, which is Tylenol. Too much acetaminophen (Tylenol) can be harmful.     · If your doctor prescribed antibiotics, take them as directed. Do not stop taking them just because you feel better. You need to take the full course of antibiotics.     · If you think your pain medicine is making you sick to your stomach:  ? Take your medicine after meals (unless your doctor has told you not to). ?  Ask your doctor for a different pain medicine. Incision care    · If you have strips of tape on the cut (incision) the doctor made, leave the tape on for about 1 week or until it falls off.     · After you can shower, wash the area daily with warm, soapy water and pat it dry. Follow-up care is a key part of your treatment and safety. Be sure to make and go to all appointments, and call your doctor if you are having problems. It's also a good idea to know your test results and keep a list of the medicines you take. When should you call for help? Call 911 anytime you think you may need emergency care. For example, call if:    · You passed out (lost consciousness).     · You have chest pain, are short of breath, or cough up blood. Call your doctor now or seek immediate medical care if:    · You have pain that does not get better after you take pain medicine.     · You cannot pass stools or gas.     · You are sick to your stomach or cannot drink fluids.     · You have signs of a blood clot in your leg (called a deep vein thrombosis), such as:  ? Pain in your calf, back of the knee, thigh, or groin. ? Redness or swelling in your leg.     · You have signs of infection, such as:  ? Increased pain, swelling, warmth, or redness. ? Red streaks leading from the incision. ? Pus draining from the incision. ? A fever.     · You have loose stitches, or your incision comes open.     · Bright red blood has soaked through the bandage over your incision. Watch closely for changes in your health, and be sure to contact your doctor if:    · You have any problems.     · You have new or worse swelling or pain in your arm. Where can you learn more? Go to http://www.[employer].com/  Enter H004 in the search box to learn more about \"[place] Node Biopsy: What to Expect at Home. \"  Current as of: December 17, 2020               Content Version: 12.8  © 7141-4031 [employer]wise, Incorporated.    Care instructions adapted under license by [employer] (which disclaims liability or warranty for this information). If you have questions about a medical condition or this instruction, always ask your healthcare professional. [address] any warranty or liability for your use of this information.

## 2021-04-16 NOTE — INTERVAL H&P NOTE
Update History & Physical 
 
The Patient's History and Physical of March 25, 2021 was reviewed with the patient and I examined the patient. There was no change. The surgical site was confirmed by the patient and me. Plan:  The risk, benefits, expected outcome, and alternative to the recommended procedure have been discussed with the patient. Patient understands and wants to proceed with the procedure.  
 
Electronically signed by Jenene Cheadle, MD on 0/45/2126 at 1:06 PM

## 2021-04-18 ENCOUNTER — HOSPITAL ENCOUNTER (EMERGENCY)
Age: 66
Discharge: HOME | End: 2021-04-18
Attending: EMERGENCY MEDICINE
Payer: COMMERCIAL

## 2021-04-18 VITALS
DIASTOLIC BLOOD PRESSURE: 63 MMHG | HEIGHT: 68 IN | RESPIRATION RATE: 16 BRPM | SYSTOLIC BLOOD PRESSURE: 123 MMHG | TEMPERATURE: 98.1 F | WEIGHT: 195 LBS | OXYGEN SATURATION: 99 % | BODY MASS INDEX: 29.55 KG/M2 | HEART RATE: 58 BPM

## 2021-04-18 DIAGNOSIS — L76.82 OTHER POSTOPERATIVE COMPLICATION OF SKIN: Primary | ICD-10-CM

## 2021-04-18 PROCEDURE — 99283 EMERGENCY DEPT VISIT LOW MDM: CPT

## 2021-04-18 NOTE — ED NOTES
I have reviewed discharge instructions with the patient. The patient verbalized understanding. Patient left ED via Discharge Method: ambulatory to Home with   Opportunity for questions and clarification provided. Patient given 0 scripts. To continue your aftercare when you leave the hospital, you may receive an automated call from our care team to check in on how you are doing. This is a free service and part of our promise to provide the best care and service to meet your aftercare needs.  If you have questions, or wish to unsubscribe from this service please call [phone]. Thank you for Choosing our [facility] Emergency Department.

## 2021-04-18 NOTE — ED PROVIDER NOTES
64 y/o f to ed with her  at side with concern for bleeding this am. She had left breast lumpectomy on Friday (2 days ago) and woke this am with blood on sheet and a quarter sized clot to her dressing on her breast.  No other complains           Past Medical History:   Diagnosis Date    Abnormal Pap smear     Cancer of left breast (HCC)     Chicken pox     Cyst of breast     Left and right breast cysts.  Measles     Mumps        Past Surgical History:   Procedure Laterality Date    CKC, AKA COLD KNIFE CONE      around 2003    HX BREAST BIOPSY Left 03/18/2021    left retroareolar assymetry    HX BREAST BIOPSY Left 4/16/2021    LEFT BREAST NEEDLE LOCALIZED LUMPECTOMY performed by Froylan Uriostegui MD at [facility]         Family History:   Problem Relation Age of Onset   Gentry Pearson Breast Cancer Mother 62    Osteoporosis Other     Diabetes Maternal Grandfather     Heart Disease Paternal Grandfather     Hypertension Paternal Grandfather     Colon Cancer Neg Hx     Ovarian Cancer Neg Hx        Social History     Socioeconomic History    Marital status:      Spouse name: Not on file    Number of children: Not on file    Years of education: Not on file    Highest education level: Not on file   Occupational History    Not on file   Social Needs    Financial resource strain: Not on file    Food insecurity     Worry: Not on file     Inability: Not on file    Transportation needs     Medical: Not on file     Non-medical: Not on file   Tobacco Use    Smoking status: Never Smoker    Smokeless tobacco: Never Used   Substance and Sexual Activity    Alcohol use:  Yes     Alcohol/week: 1.0 standard drinks     Types: 1 Glasses of wine per week     Comment: social    Drug use: Never    Sexual activity: Yes     Partners: Male     Birth control/protection: Surgical     Comment: vasectomy   Lifestyle    Physical activity     Days per week: Not on file     Minutes per session: Not on file    Stress: Not on file   Relationships    Social connections     Talks on phone: Not on file     Gets together: Not on file     Attends Buddhism service: Not on file     Active member of club or organization: Not on file     Attends meetings of clubs or organizations: Not on file     Relationship status: Not on file    Intimate partner violence     Fear of current or ex partner: Not on file     Emotionally abused: Not on file     Physically abused: Not on file     Forced sexual activity: Not on file   Other Topics Concern    Not on file   Social History Narrative    Not on file         ALLERGIES: Patient has no known allergies. Review of Systems   Skin: Positive for wound. All other systems reviewed and are negative. Vitals:    04/18/21 0913   BP: 123/63   Pulse: (!) 58   Resp: 16   Temp: 98.1 °F (36.7 °C)   SpO2: 99%   Weight: 88.5 kg (195 lb)   Height: 5' 8\" (1.727 m)            Physical Exam  Constitutional:       Appearance: Normal appearance. HENT:      Head: Normocephalic and atraumatic. Nose: Nose normal.      Mouth/Throat:      Mouth: Mucous membranes are moist.   Eyes:      Pupils: Pupils are equal, round, and reactive to light. Chest:       Musculoskeletal:         General: Tenderness present. Skin:     General: Skin is warm and dry. Neurological:      General: No focal deficit present. Mental Status: She is alert. Psychiatric:         Mood and Affect: Mood normal.         Thought Content: Thought content normal.         Judgment: Judgment normal.          MDM  Number of Diagnoses or Management Options  Diagnosis management comments: Pt tells me she was laying on her side - I suspect she popped a stitch. Area cleaned, new steri strips placed. Reassurance given.   To follow with her surgeon on Monday  (rosanne lucero and kevin lucero chaperones)    Risk of Complications, Morbidity, and/or Mortality  Presenting problems: minimal  Diagnostic procedures: minimal  Management options: minimal    Patient Progress  Patient progress: stable         Procedures

## 2021-04-18 NOTE — ED TRIAGE NOTES
Ambulatory to triage without difficulty. Pt states she had a lumpectomy of the left breast by Dr Ewa Mensah on Friday and woke up this morning with blood on the sheets. Took bandage off of breast and saw a quarter sized clot on the bandage. Denies fevers.

## 2021-04-19 NOTE — PROGRESS NOTES
ER on 4/18 for bleeding through incision. Patient states she may have popped a stitch. ER discharged patient home with new bandage. No issues since.

## 2021-04-23 NOTE — OP NOTES
[address] [address]  OPERATIVE REPORT    Name:  Dudley Vasques  MR#:  [id number]  :  1955  ACCOUNT #:  [de-identified]  DATE OF SERVICE:  2021    PREOPERATIVE DIAGNOSIS:  Left breast cancer. POSTOPERATIVE DIAGNOSIS:  Left breast cancer. PROCEDURE PERFORMED:  Wide localized left-sided lumpectomy and sentinel lymph node biopsy, left axilla with lymphatic mapping. SURGEON:  Radha Rey MD    ASSISTANT:  None. ANESTHESIA:  General anesthetic. COMPLICATIONS:  None. SPECIMENS REMOVED:  Left lumpectomy, left sentinel lymph node biopsy. IMPLANTS:  none. ESTIMATED BLOOD LOSS:  10 mL. CONDITION AT COMPLETION:  Stable. INDICATIONS:  This patient is a 79-year-old female with newly diagnosed left-sided breast cancer. The risks, benefits and alternatives to surgical option were discussed in detail with the patient prior to surgery. She understood the risks and wished to proceed. Appropriate consent was given. PROCEDURE:  The patient underwent wire localization of the biopsy clip of the left breast in the Radiology Suite. The patient underwent nuclear medicine lymphatic mapping prior to surgery. She was then taken to the operating room, where general endotracheal anesthetic was performed without difficulty. The breast and axilla were prepped and draped in routine sterile fashion. A time-out was performed identifying the patient, procedure and lateralities. Lumpectomy was performed first.  A small skin incision was made following the skin lines. A longer dissection down the localization wire was performed to the appropriate site. Here, generous lumpectomy was taken around the specimen to include the localization wire and the biopsy clip at its center. Once completed, specimen was marked for orientation with a long suture lateral and a short suture superior, double suture deep.   Specimen radiograph was performed immediately, which showed what appeared to be a close deep margin. Therefore, a second separate deep margin was taken, another centimeter of tissue was taken past the point of the wire and the specimen was labeled with a suture marked in the new surgical margin. Irrigation of the cavity was performed. Hemostasis was assured. The incision was closed in two layers with interrupted 3-0 Vicryl and 4-0 subcutaneous Monocryl. Attention was turned to the axilla, where the Neoprobe was used to localize the sentinel node. Small skin incision was made overlying the increased uptake and dissection to the axillary contents was performed carefully with electrocautery. Once axillary contents were entered, blunt dissection was performed to identify the sentinel node, which was dissected free and removed intact and sent to Pathology for further review. Hemostasis was assured. Scanning of the axilla after the node removal showed very little to no uptake remaining. The incision was closed in two layers with 3-0 and 4-0 subcutaneous Monocryl. Steri-Strip dressings were applied to both incisions. The patient tolerated the procedure well with no complications. She was awakened and extubated, and taken to recovery in good condition. After extubation, instrument and lap counts were correct x2 at completion.       MD FARRAH [email or web address]  D:  04/22/2021 12:44  T:  04/23/2021 0:20  JOB #:  [id number]

## 2021-05-05 ENCOUNTER — PATIENT OUTREACH (OUTPATIENT)
Dept: CASE MANAGEMENT | Age: 66
End: 2021-05-05

## 2021-05-05 NOTE — PROGRESS NOTES
5/5/2021 saw the patient with Dr Yamilet Feldman for her initial oncology visit regarding breast  Cancer. She will not need chemotherapy but Dr Yamilet Feldman discussed with her that she would need radiation and hormone blocking pill. I have given her printed information regarding arimidex. Navigation information given to the patient.

## 2021-05-11 ENCOUNTER — HOSPITAL ENCOUNTER (OUTPATIENT)
Dept: RADIATION ONCOLOGY | Age: 66
Discharge: HOME | End: 2021-05-11
Payer: COMMERCIAL

## 2021-05-11 VITALS
BODY MASS INDEX: 30.08 KG/M2 | SYSTOLIC BLOOD PRESSURE: 113 MMHG | WEIGHT: 194.9 LBS | DIASTOLIC BLOOD PRESSURE: 72 MMHG | HEART RATE: 66 BPM | OXYGEN SATURATION: 95 % | TEMPERATURE: 98 F

## 2021-05-11 PROCEDURE — 99211 OFF/OP EST MAY X REQ PHY/QHP: CPT

## 2021-05-11 NOTE — NURSE NAVIGATOR
Consult left breast cancer. Lumpectomy 21. F/u Dr. Katharina Lugo 21. Plan is RT then Arimidex. Pt denies pain. Spouse present for consult. Pt is  3/ Para 3. Started menses at age 17. Pt is post menopausal.  No history of Oral contraceptive use or HRT. CONSENTS SIGNED FOR RT LEFT BREAST.   CT [facility] SCHEDULED Tuesday, 21 AT 10 AM.  APT GIVEN TO PT.  SKIN CARE SHEET GIVEN AND EXPLAINED TO PT.     Law Kohli RN

## 2021-05-11 NOTE — PROGRESS NOTES
Patient: Aubree Jones MRN: [id number]  SSN: [id number]    YOB: 1955  Age: 63 y.o. Sex: female      Other Providers:  Abnormal screening mammogram    CHIEF COMPLAINT: Dr. Pepe Dimas, Dr. 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 female who I am seeing at the request of Dr. Pepe Dimas. Ms. Bradley was in her usual state of health until 2/24/2021 at which time she presented for routine screening mammogram. This demonstrated an area of possible architectural distortion in the lower, medial left breast at 3.5cm from the nipple. A diagnostic mammogram and ultrasound was performed 3/3/2021. On mammogram there was a small architectural distortion in the anterior left breast, directly behind the nipple. There was no suspicious sonographic correlate. Bilateral MRI was performed 3/15/2021 which demonstrated a 1cm multilobular retroareolar mass with worrisome features. There was no evidence of right breast malignancy. Given extensive bilateral parenchymal enhancement, follow up breast MRI in one year was recommended. A biopsy was performed 3/18/2021 which demonstrated infiltrating ductal carcinoma with lobular features, low grade. Focal lobular carcinoma in situ was present, definite LVI was not identified. The lesion was ER positive (93%), KS positive (82%), HER2/desire negative (1+). On 4/16/2021, she underwent a left breast lumpectomy demonstrating invasive ductal carcinoma with lobular features, low grade, measuring 8mm in greatest dimension. The posterior margin was initially positive for invasive carcinoma, final deep medial margin was negative. One left sentinel lymph node was negative for metastatic carcinoma. She has met with Dr. Pepe Dimas, who recommended adjuvant endocrine therapy alone.     She is recovering from a recent cold but otherwise has good energy level. She does have some mild itching but denies pain, swelling, and tenderness in the left breast. She has had no recent fevers or chills. PAST MEDICAL HISTORY:    Past Medical History:   Diagnosis Date    Abnormal Pap smear     Cancer of left breast ([address]     Chicken pox     Cyst of breast     Left and right breast cysts.  Measles     Mumps        The patient denies history of collagen vascular diseases, pacemaker insertion, prior radiation or prior chemotherapy. PAST SURGICAL HISTORY:   Past Surgical History:   Procedure Laterality Date    CKC, AKA COLD KNIFE CONE      around 2003    HX BREAST BIOPSY Left 03/18/2021    left retroareolar assymetry    HX BREAST BIOPSY Left 4/16/2021    LEFT BREAST NEEDLE LOCALIZED LUMPECTOMY performed by Lorin Sood MD at [address]:     Current Outpatient Medications:     anastrozole (Arimidex) 1 mg tablet, Take 1 mg by mouth daily. , Disp: 30 Tab, Rfl: 5    ALLERGIES:   No Known Allergies    SOCIAL HISTORY:   Social History     Socioeconomic History    Marital status:      Spouse name: Not on file    Number of children: Not on file    Years of education: Not on file    Highest education level: Not on file   Occupational History    Not on file   Social Needs    Financial resource strain: Not on file    Food insecurity     Worry: Not on file     Inability: Not on file    Transportation needs     Medical: Not on file     Non-medical: Not on file   Tobacco Use    Smoking status: Never Smoker    Smokeless tobacco: Never Used   Substance and Sexual Activity    Alcohol use:  Yes     Alcohol/week: 1.0 standard drinks     Types: 1 Glasses of wine per week     Comment: social    Drug use: Never    Sexual activity: Yes     Partners: Male     Birth control/protection: Surgical     Comment: vasectomy   Lifestyle    Physical activity     Days per week: Not on file     Minutes per session: Not on file    Stress: Not on file Relationships    Social connections     Talks on phone: Not on file     Gets together: Not on file     Attends Jain service: Not on file     Active member of club or organization: Not on file     Attends meetings of clubs or organizations: Not on file     Relationship status: Not on file    Intimate partner violence     Fear of current or ex partner: Not on file     Emotionally abused: Not on file     Physically abused: Not on file     Forced sexual activity: Not on file   Other Topics Concern    Not on file   Social History Narrative    Not on file       FAMILY HISTORY:   Family History   Problem Relation Age of Onset    Breast Cancer Mother 62    Osteoporosis Other     Diabetes Maternal Grandfather     Heart Disease Paternal Grandfather     Hypertension Paternal Grandfather     Colon Cancer Neg Hx     Ovarian Cancer Neg Hx        REVIEW OF SYSTEMS:   A full 12-point review of systems was completed and was negative unless noted in the history of present illness. PHYSICAL EXAMINATION:   ECOG Performance status 0  VITAL SIGNS:   Visit Vitals  /72 (BP 1 Location: Right upper arm, BP Patient Position: Sitting)   Pulse 66   Temp 98 °F (36.7 °C)   Wt 88.4 kg (194 lb 14.4 oz)   SpO2 95%   BMI 30.08 kg/m²       General: well developed/nourished adult Female in no acute distress; appears stated age  [de-identified]: normocephalic, atraumatic; EOMI  Neck: supple with full ROM; no cervical lymphadenopathy  Cardiovascular: normal S1 and S2, RRR, no murmurs  Respiratory: normal inspiratory effort, no audible wheezes  Extremities: no cyanosis, clubbing, or edema  Musculoskeletal: mobility intact x4; normal ROM in all joints  Skin: no skin lesions identified  Neuro: AOx3; sensation intact x 4; CNII-XII grossly intact  Psych: appropriate affect, insight, and judgement  GI: abdomen soft, non-distended  : not indicated   Breast: Breasts symmetric bilaterally without skin changes or nipple discharge.  Well healed left breast salbador areolar and axillary incisions with mild fluid collection in the lumpectomy cavity. Erythema medially due to scratching, not convincing for cellulitis. No palpable mass in the left breast. Right breast without palpable masses. PATHOLOGY:    I personally reviewed the pathology reports as documented in the HPI. LABORATORY: No results found for: NA, K, CL, CO2, AGAP, GLU, BUN, CREA, GFRAA, GFRNA, CA, MG, PHOS, ALB, TBIL, TP, ALB, GLOB, AGRAT, ALT  No results found for: WBC, HGB, HCT, PLT, HGBEXT, HCTEXT, PLTEXT    RADIOLOGY:    I personally reviewed the images and agree with the findings as documented in the HPI. IMPRESSION:  Niki Peck is a 63 y.o. female with left breast pT1bN0 invasive carcinoma with mixed ductal and lobular features now s/p lumpectomy presenting for discussion of radiation therapy. I had a long discussion with Niki Peck regarding treatment options, specifically adjuvant radiation therapy to the left breast. I discussed the use of whole breast radiation in order to minimize the risk of breast cancer recurrence and improve survival. Given her favorable features and age I do not feel a longer course of treatment with a boost would significantly improve her outcomes. I reviewed in detail the anticipated acute and late toxicities of radiation therapy as well as the logistics of treatment and expected disease control. Niki Peck and her family had the opportunity to ask questions which appeared to be answered to their satisfaction and have elected to proceed with treatment. PLAN:    1) Consented patient for treatment with external beam radiation after discussing risk, benefits, and side effects from treatment. 2) Reviewed available research treatment and cancer care protocols for which patient may be eligible. Unfortunately there are no matching clinical trials available at this time. 3) CT simulation to be scheduled.       Itz Cochran MD   May 11, 2021

## 2021-05-18 ENCOUNTER — HOSPITAL ENCOUNTER (OUTPATIENT)
Dept: RADIATION ONCOLOGY | Age: 66
Discharge: HOME | End: 2021-05-18
Payer: COMMERCIAL

## 2021-05-18 PROCEDURE — 77332 RADIATION TREATMENT AID(S): CPT

## 2021-05-18 PROCEDURE — 77290 THER RAD SIMULAJ FIELD CPLX: CPT

## 2021-05-20 ENCOUNTER — HOSPITAL ENCOUNTER (OUTPATIENT)
Dept: RADIATION ONCOLOGY | Age: 66
Discharge: HOME | End: 2021-05-20
Payer: COMMERCIAL

## 2021-05-20 PROCEDURE — 77295 3-D RADIOTHERAPY PLAN: CPT

## 2021-05-20 PROCEDURE — 77334 RADIATION TREATMENT AID(S): CPT

## 2021-05-20 PROCEDURE — 77300 RADIATION THERAPY DOSE PLAN: CPT

## 2021-05-22 ENCOUNTER — HOSPITAL ENCOUNTER (OUTPATIENT)
Dept: MAMMOGRAPHY | Age: 66
Discharge: HOME | End: 2021-05-22
Attending: INTERNAL MEDICINE
Payer: COMMERCIAL

## 2021-05-22 DIAGNOSIS — Z91.89 AT RISK FOR OSTEOPOROSIS: ICD-10-CM

## 2021-05-22 PROCEDURE — 77080 DXA BONE DENSITY AXIAL: CPT

## 2021-05-26 ENCOUNTER — HOSPITAL ENCOUNTER (OUTPATIENT)
Dept: RADIATION ONCOLOGY | Age: 66
Discharge: HOME | End: 2021-05-26
Payer: COMMERCIAL

## 2021-05-26 PROCEDURE — 77280 THER RAD SIMULAJ FIELD SMPL: CPT

## 2021-05-26 PROCEDURE — 77412 RADIATION TX DELIVERY LVL 3: CPT

## 2021-05-27 ENCOUNTER — HOSPITAL ENCOUNTER (OUTPATIENT)
Dept: RADIATION ONCOLOGY | Age: 66
Discharge: HOME | End: 2021-05-27
Payer: COMMERCIAL

## 2021-05-27 PROCEDURE — 77387 GUIDANCE FOR RADJ TX DLVR: CPT

## 2021-05-27 PROCEDURE — 77412 RADIATION TX DELIVERY LVL 3: CPT

## 2021-05-28 ENCOUNTER — HOSPITAL ENCOUNTER (OUTPATIENT)
Dept: RADIATION ONCOLOGY | Age: 66
Discharge: HOME | End: 2021-05-28
Payer: COMMERCIAL

## 2021-05-28 VITALS
DIASTOLIC BLOOD PRESSURE: 80 MMHG | WEIGHT: 195 LBS | BODY MASS INDEX: 30.09 KG/M2 | HEART RATE: 70 BPM | OXYGEN SATURATION: 99 % | TEMPERATURE: 97.9 F | SYSTOLIC BLOOD PRESSURE: 116 MMHG

## 2021-05-28 PROCEDURE — 77387 GUIDANCE FOR RADJ TX DLVR: CPT

## 2021-05-28 PROCEDURE — 77412 RADIATION TX DELIVERY LVL 3: CPT

## 2021-06-01 ENCOUNTER — HOSPITAL ENCOUNTER (OUTPATIENT)
Dept: RADIATION ONCOLOGY | Age: 66
Discharge: HOME | End: 2021-06-01
Payer: COMMERCIAL

## 2021-06-01 PROCEDURE — 77387 GUIDANCE FOR RADJ TX DLVR: CPT

## 2021-06-01 PROCEDURE — 77412 RADIATION TX DELIVERY LVL 3: CPT

## 2021-06-02 ENCOUNTER — HOSPITAL ENCOUNTER (OUTPATIENT)
Dept: RADIATION ONCOLOGY | Age: 66
Discharge: HOME | End: 2021-06-02
Payer: COMMERCIAL

## 2021-06-02 PROCEDURE — 77387 GUIDANCE FOR RADJ TX DLVR: CPT

## 2021-06-02 PROCEDURE — 77412 RADIATION TX DELIVERY LVL 3: CPT

## 2021-06-03 ENCOUNTER — HOSPITAL ENCOUNTER (OUTPATIENT)
Dept: RADIATION ONCOLOGY | Age: 66
Discharge: HOME | End: 2021-06-03
Payer: COMMERCIAL

## 2021-06-03 PROCEDURE — 77387 GUIDANCE FOR RADJ TX DLVR: CPT

## 2021-06-03 PROCEDURE — 77412 RADIATION TX DELIVERY LVL 3: CPT

## 2021-06-03 PROCEDURE — 77336 RADIATION PHYSICS CONSULT: CPT

## 2021-06-04 ENCOUNTER — HOSPITAL ENCOUNTER (OUTPATIENT)
Dept: RADIATION ONCOLOGY | Age: 66
Discharge: HOME | End: 2021-06-04
Payer: COMMERCIAL

## 2021-06-04 VITALS
HEART RATE: 58 BPM | BODY MASS INDEX: 30.51 KG/M2 | OXYGEN SATURATION: 98 % | DIASTOLIC BLOOD PRESSURE: 60 MMHG | SYSTOLIC BLOOD PRESSURE: 126 MMHG | TEMPERATURE: 98.6 F | WEIGHT: 197.7 LBS

## 2021-06-04 PROCEDURE — 77412 RADIATION TX DELIVERY LVL 3: CPT

## 2021-06-04 PROCEDURE — 77387 GUIDANCE FOR RADJ TX DLVR: CPT

## 2021-06-04 NOTE — PROGRESS NOTES
Patient: Milo Diamond MRN: [id number]  SSN: [id number]    YOB: 1955  Age: 63 y.o. 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 female being treated for left breast pT1bN0 invasive carcinoma with mixed ductal and lobular features. Week 1: No complaints. Week 2: No complaints. OBJECTIVE:  NAD  Visit Vitals  /60   Pulse (!) 58   Temp 98.6 °F (37 °C)   Wt 89.7 kg (197 lb 11.2 oz)   SpO2 98%   BMI 30.51 kg/m²       No results found for: NA, K, CL, CO2, AGAP, GLU, BUN, CREA, GFRAA, GFRNA, CA, MG, PHOS, ALB, TBIL, TP, ALB, GLOB, AGRAT, ALT  No results found for: WBC, HGB, HCT, PLT, HGBEXT, HCTEXT, PLTEXT, HGBEXT, HCTEXT, PLTEXT    ASSESSMENT and PLAN:  Milo Diamond is tolerating radiation as anticipated for the current dose and fraction. We will continue on as planned with another treatment visit anticipated next week.         Richard Rueda MD   June 4, 2021

## 2021-06-07 ENCOUNTER — HOSPITAL ENCOUNTER (OUTPATIENT)
Dept: RADIATION ONCOLOGY | Age: 66
Discharge: HOME | End: 2021-06-07
Payer: COMMERCIAL

## 2021-06-07 PROCEDURE — 77387 GUIDANCE FOR RADJ TX DLVR: CPT

## 2021-06-07 PROCEDURE — 77412 RADIATION TX DELIVERY LVL 3: CPT

## 2021-06-08 ENCOUNTER — HOSPITAL ENCOUNTER (OUTPATIENT)
Dept: RADIATION ONCOLOGY | Age: 66
Discharge: HOME | End: 2021-06-08
Payer: COMMERCIAL

## 2021-06-08 PROCEDURE — 77412 RADIATION TX DELIVERY LVL 3: CPT

## 2021-06-08 PROCEDURE — 77387 GUIDANCE FOR RADJ TX DLVR: CPT

## 2021-06-09 ENCOUNTER — HOSPITAL ENCOUNTER (OUTPATIENT)
Dept: RADIATION ONCOLOGY | Age: 66
Discharge: HOME | End: 2021-06-09
Payer: COMMERCIAL

## 2021-06-09 PROCEDURE — 77387 GUIDANCE FOR RADJ TX DLVR: CPT

## 2021-06-09 PROCEDURE — 77412 RADIATION TX DELIVERY LVL 3: CPT

## 2021-06-10 ENCOUNTER — HOSPITAL ENCOUNTER (OUTPATIENT)
Dept: RADIATION ONCOLOGY | Age: 66
Discharge: HOME | End: 2021-06-10
Payer: COMMERCIAL

## 2021-06-10 PROCEDURE — 77387 GUIDANCE FOR RADJ TX DLVR: CPT

## 2021-06-10 PROCEDURE — 77412 RADIATION TX DELIVERY LVL 3: CPT

## 2021-06-10 PROCEDURE — 77336 RADIATION PHYSICS CONSULT: CPT

## 2021-06-11 ENCOUNTER — HOSPITAL ENCOUNTER (OUTPATIENT)
Dept: RADIATION ONCOLOGY | Age: 66
Discharge: HOME | End: 2021-06-11
Payer: COMMERCIAL

## 2021-06-11 VITALS
WEIGHT: 196.2 LBS | HEART RATE: 63 BPM | SYSTOLIC BLOOD PRESSURE: 107 MMHG | TEMPERATURE: 97.8 F | DIASTOLIC BLOOD PRESSURE: 69 MMHG | OXYGEN SATURATION: 97 % | BODY MASS INDEX: 30.28 KG/M2

## 2021-06-11 DIAGNOSIS — Z17.0 MALIGNANT NEOPLASM OF CENTRAL PORTION OF LEFT BREAST IN FEMALE, ESTROGEN RECEPTOR POSITIVE (HCC): Primary | ICD-10-CM

## 2021-06-11 DIAGNOSIS — C50.112 MALIGNANT NEOPLASM OF CENTRAL PORTION OF LEFT BREAST IN FEMALE, ESTROGEN RECEPTOR POSITIVE (HCC): Primary | ICD-10-CM

## 2021-06-11 PROCEDURE — 77412 RADIATION TX DELIVERY LVL 3: CPT

## 2021-06-11 PROCEDURE — 77387 GUIDANCE FOR RADJ TX DLVR: CPT

## 2021-06-11 NOTE — PROGRESS NOTES
Patient: Howie Hernandez MRN: [id number]  SSN: [id number]    YOB: 1955  Age: 63 y.o. 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 female being treated for left breast pT1bN0 invasive carcinoma with mixed ductal and lobular features. Week 1: No complaints. Week 2: No complaints. Week 3: Mild skin irritation. OBJECTIVE:  NAD  There were no vitals taken for this visit. No results found for: NA, K, CL, CO2, AGAP, GLU, BUN, CREA, GFRAA, GFRNA, CA, MG, PHOS, ALB, TBIL, TP, ALB, GLOB, AGRAT, ALT  No results found for: WBC, HGB, HCT, PLT, HGBEXT, HCTEXT, PLTEXT, HGBEXT, HCTEXT, PLTEXT    ASSESSMENT and PLAN:  Howie Hernandez is tolerating radiation as anticipated for the current dose and fraction. She completes radiation therapy 6/16/21 and will return in 6 months with repeat mammogram.  - Aquaphor for skin reaction.        Radha Danielle MD   June 11, 2021

## 2021-06-14 ENCOUNTER — HOSPITAL ENCOUNTER (OUTPATIENT)
Dept: RADIATION ONCOLOGY | Age: 66
Discharge: HOME | End: 2021-06-14
Payer: COMMERCIAL

## 2021-06-14 PROCEDURE — 77387 GUIDANCE FOR RADJ TX DLVR: CPT

## 2021-06-14 PROCEDURE — 77412 RADIATION TX DELIVERY LVL 3: CPT

## 2021-06-15 ENCOUNTER — HOSPITAL ENCOUNTER (OUTPATIENT)
Dept: RADIATION ONCOLOGY | Age: 66
Discharge: HOME | End: 2021-06-15
Payer: COMMERCIAL

## 2021-06-15 PROCEDURE — 77387 GUIDANCE FOR RADJ TX DLVR: CPT

## 2021-06-15 PROCEDURE — 77412 RADIATION TX DELIVERY LVL 3: CPT

## 2021-06-16 ENCOUNTER — HOSPITAL ENCOUNTER (OUTPATIENT)
Dept: RADIATION ONCOLOGY | Age: 66
Discharge: HOME | End: 2021-06-16
Payer: COMMERCIAL

## 2021-06-16 PROCEDURE — 77412 RADIATION TX DELIVERY LVL 3: CPT

## 2021-06-16 PROCEDURE — 77387 GUIDANCE FOR RADJ TX DLVR: CPT

## 2021-06-17 ENCOUNTER — HOSPITAL ENCOUNTER (OUTPATIENT)
Dept: RADIATION ONCOLOGY | Age: 66
Discharge: HOME | End: 2021-06-17
Payer: COMMERCIAL

## 2021-07-12 ENCOUNTER — PATIENT OUTREACH (OUTPATIENT)
Dept: CASE MANAGEMENT | Age: 66
End: 2021-07-12

## 2021-07-12 ENCOUNTER — HOSPITAL ENCOUNTER (OUTPATIENT)
Dept: LAB | Age: 66
Discharge: HOME | End: 2021-07-12
Payer: COMMERCIAL

## 2021-07-12 DIAGNOSIS — C50.912 MALIGNANT NEOPLASM OF LEFT BREAST IN FEMALE, ESTROGEN RECEPTOR POSITIVE, UNSPECIFIED SITE OF BREAST (HCC): ICD-10-CM

## 2021-07-12 DIAGNOSIS — Z17.0 MALIGNANT NEOPLASM OF LEFT BREAST IN FEMALE, ESTROGEN RECEPTOR POSITIVE, UNSPECIFIED SITE OF BREAST (HCC): ICD-10-CM

## 2021-07-12 LAB
ALBUMIN SERPL-MCNC: 3.9 G/DL (ref 3.2–4.6)
ALBUMIN/GLOB SERPL: 1.3 {RATIO} (ref 1.2–3.5)
ALP SERPL-CCNC: 78 U/L (ref 50–136)
ALT SERPL-CCNC: 23 U/L (ref 12–65)
ANION GAP SERPL CALC-SCNC: 4 MMOL/L (ref 7–16)
AST SERPL-CCNC: 18 U/L (ref 15–37)
BASOPHILS # BLD: 0.1 K/UL (ref 0–0.2)
BASOPHILS NFR BLD: 1 % (ref 0–2)
BILIRUB SERPL-MCNC: 0.5 MG/DL (ref 0.2–1.1)
BUN SERPL-MCNC: 18 MG/DL (ref 8–23)
CALCIUM SERPL-MCNC: 8.8 MG/DL (ref 8.3–10.4)
CHLORIDE SERPL-SCNC: 111 MMOL/L (ref 98–107)
CO2 SERPL-SCNC: 25 MMOL/L (ref 21–32)
CREAT SERPL-MCNC: 0.7 MG/DL (ref 0.6–1)
DIFFERENTIAL METHOD BLD: NORMAL
EOSINOPHIL # BLD: 0.2 K/UL (ref 0–0.8)
EOSINOPHIL NFR BLD: 4 % (ref 0.5–7.8)
ERYTHROCYTE [DISTWIDTH] IN BLOOD BY AUTOMATED COUNT: 13.5 % (ref 11.9–14.6)
GLOBULIN SER CALC-MCNC: 3.1 G/DL (ref 2.3–3.5)
GLUCOSE SERPL-MCNC: 89 MG/DL (ref 65–100)
HCT VFR BLD AUTO: 40.8 % (ref 35.8–46.3)
HGB BLD-MCNC: 13.1 G/DL (ref 11.7–15.4)
IMM GRANULOCYTES # BLD AUTO: 0 K/UL (ref 0–0.5)
IMM GRANULOCYTES NFR BLD AUTO: 0 % (ref 0–5)
LYMPHOCYTES # BLD: 0.8 K/UL (ref 0.5–4.6)
LYMPHOCYTES NFR BLD: 16 % (ref 13–44)
MCH RBC QN AUTO: 28.7 PG (ref 26.1–32.9)
MCHC RBC AUTO-ENTMCNC: 32.1 G/DL (ref 31.4–35)
MCV RBC AUTO: 89.5 FL (ref 79.6–97.8)
MONOCYTES # BLD: 0.6 K/UL (ref 0.1–1.3)
MONOCYTES NFR BLD: 12 % (ref 4–12)
NEUTS SEG # BLD: 3.6 K/UL (ref 1.7–8.2)
NEUTS SEG NFR BLD: 68 % (ref 43–78)
NRBC # BLD: 0 K/UL (ref 0–0.2)
PLATELET # BLD AUTO: 183 K/UL (ref 150–450)
PMV BLD AUTO: 9.7 FL (ref 9.4–12.3)
POTASSIUM SERPL-SCNC: 4.1 MMOL/L (ref 3.5–5.1)
PROT SERPL-MCNC: 7 G/DL (ref 6.3–8.2)
RBC # BLD AUTO: 4.56 M/UL (ref 4.05–5.2)
SODIUM SERPL-SCNC: 140 MMOL/L (ref 136–145)
WBC # BLD AUTO: 5.3 K/UL (ref 4.3–11.1)

## 2021-07-12 PROCEDURE — 85025 COMPLETE CBC W/AUTO DIFF WBC: CPT

## 2021-07-12 PROCEDURE — 36415 COLL VENOUS BLD VENIPUNCTURE: CPT

## 2021-07-12 PROCEDURE — 80053 COMPREHEN METABOLIC PANEL: CPT

## 2021-07-12 NOTE — PROGRESS NOTES
7/12/2021 Saw the patient with Dr Tom Huertas. She has completed radiation. She has started the Arimidex. She is doing well on this. Her bone density is normal She will follow up in 6 months. We discussed the treatment plan and this will be mailed to her. Navigation will sign off.

## 2022-01-04 ENCOUNTER — HOSPITAL ENCOUNTER (OUTPATIENT)
Dept: MAMMOGRAPHY | Age: 67
Discharge: HOME | End: 2022-01-04
Attending: RADIOLOGY
Payer: COMMERCIAL

## 2022-01-04 DIAGNOSIS — C50.112 MALIGNANT NEOPLASM OF CENTRAL PORTION OF LEFT BREAST IN FEMALE, ESTROGEN RECEPTOR POSITIVE (HCC): ICD-10-CM

## 2022-01-04 DIAGNOSIS — Z17.0 MALIGNANT NEOPLASM OF CENTRAL PORTION OF LEFT BREAST IN FEMALE, ESTROGEN RECEPTOR POSITIVE (HCC): ICD-10-CM

## 2022-01-04 PROCEDURE — 77062 BREAST TOMOSYNTHESIS BI: CPT

## 2022-01-14 ENCOUNTER — HOSPITAL ENCOUNTER (OUTPATIENT)
Dept: RADIATION ONCOLOGY | Age: 67
Discharge: HOME | End: 2022-01-14
Payer: COMMERCIAL

## 2022-01-14 VITALS
TEMPERATURE: 98.2 F | WEIGHT: 204.6 LBS | OXYGEN SATURATION: 98 % | BODY MASS INDEX: 31.57 KG/M2 | HEART RATE: 59 BPM | SYSTOLIC BLOOD PRESSURE: 128 MMHG | DIASTOLIC BLOOD PRESSURE: 56 MMHG

## 2022-01-14 PROCEDURE — 99211 OFF/OP EST MAY X REQ PHY/QHP: CPT

## 2022-01-14 NOTE — PROGRESS NOTES
Pt is here today for FUP post RT to the left breast IDC which ended 6/2021. The 1/4/22 3D Mammogram was negative for cancer. Pt is taking Arimidex as ordered and is followed in Med Onc by Dr. Ace Aguila-- pt has FUP at his office on 1/21/22. It is to be determined when the next Breast MRI will be completed.

## 2022-01-14 NOTE — PROGRESS NOTES
Patient: Faith Tovar MRN: [id number]  SSN: [id number]    YOB: 1955  Age: 61 y.o. Sex: female      Other Providers:  Dr. 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 female who is here today for follow up,. She continues on Arimidex per medical oncology. tolerating well. No new breast concerns or changes. No difficulty with ROM. No difficulty with swallowing. Recovered well from therapy. No lingering skin changes related to radiation therapy. Recent mammogram negative except for treatment related changes. UPDATED PAST MEDICAL HISTORY:  Since our prior encounter, Faith Tovar has not been hospitalized. There have been no significant changes to the medical history. MEDICATIONS:     Current Outpatient Medications:     anastrozole (Arimidex) 1 mg tablet, Take 1 mg by mouth daily. , Disp: 30 Tablet, Rfl: 5    ALLERGIES:   No Known Allergies    PHYSICAL EXAMINATION:   ECOG Performance status 0  VITAL SIGNS:   Visit Vitals  BP (!) 128/56   Pulse (!) 59   Temp 98.2 °F (36.8 °C)   Wt 204 lb 9.6 oz (92.8 kg)   SpO2 98%   BMI 31.57 kg/m²        GENERAL: The patient is well-developed, ambulatory, alert and in no acute distress. HEENT: Head is normocephalic, atraumatic. Pupils are equal, round and reactive to light and accommodation. Extraocular movement intact. Respiratory: no audible wheezes, no distress  Lymph nodes: no LAD noted.    Neuro: alert and oriented     LABORATORY:   Lab Results   Component Value Date/Time    Sodium 140 07/12/2021 09:44 AM    Potassium 4.1 07/12/2021 09:44 AM    Chloride 111 (H) 07/12/2021 09:44 AM    CO2 25 07/12/2021 09:44 AM    Anion gap 4 (L) 07/12/2021 09:44 AM    Glucose 89 07/12/2021 09:44 AM    BUN 18 07/12/2021 09:44 AM    Creatinine 0.70 07/12/2021 09:44 AM    GFR est AA >60 07/12/2021 09:44 AM    GFR est non-AA >60 07/12/2021 09:44 AM    Calcium 8.8 07/12/2021 09:44 AM    Albumin 3.9 07/12/2021 09:44 AM    Protein, total 7.0 07/12/2021 09:44 AM    Globulin 3.1 07/12/2021 09:44 AM    A-G Ratio 1.3 07/12/2021 09:44 AM    ALT (SGPT) 23 07/12/2021 09:44 AM     Lab Results   Component Value Date/Time    WBC 5.3 07/12/2021 09:44 AM    HGB 13.1 07/12/2021 09:44 AM    HCT 40.8 07/12/2021 09:44 AM    PLATELET 296 03/25/6184 09:44 AM       RADIOLOGY:  [facility] 3D LIZZY W MAMMO BI DX INCL CAD    Result Date: 1/4/2022  DIAGNOSTIC DIGITAL BILATERAL TOMOSYNTHESIS MAMMOGRAPHY CLINICAL INDICATION: Annual bilateral mammography due, follow-up of previous left lumpectomy for breast cancer in April 2021, currently 6 months after completing radiation. No new problems or symptoms reported. Current antihormone therapy. Her mother and maternal grandmother had breast cancer. COMPARISON: 4/16/2021 and multiple others going back to 6/3/2013 FINDINGS: Mammogram: Digital diagnostic mammography was performed, and is interpreted in conjunction with a computer assisted detection (CAD) system. Standard views and Tomosynthesis bilaterally demonstrate heterogeneously dense glandular tissues which can limit sensitivity for lesions. Additional left mediolateral view performed. Stable diffuse bilateral typically benign calcifications with no suspicious developing cluster. Mild expected postoperative and radiation changes including scarring, distortion, edema, skin thickening on the left. No suspicious developing mass, nonsurgical distortion or nipple retraction on either side. 1. Left expected lumpectomy and radiation change . 2. No evidence of residual or recurrent malignancy. Recommend screening mammography in one year. 3. Breast MRI would follow-up is due in March 2022 (as recommended on 3/15/2021 breast MRI).  This would also be beneficial given dense complex postoperative breast parenchyma and elevated risk with the patient's personal and strong family history of breast cancer. Results today were reported to the patient at the time of interpretation. BI-RADS Assessment Category 2: Benign finding BD3 We are including breast density information for the patient along with the mammogram report. IMPRESSION:  Mariana Roblero is a 61 y.o. female who has recovered well from therapy     PLAN:    - recent mammogram negative for malignancy and only treatment related changes noted. Repeat due in 1 year.   -continue arimidex per medical oncology  -continue to lotion the breast and complete SBE. RTC in 6 months or sooner if needed. Portions of this note were copied from prior encounters and reviewed for accuracy, currency, and represent documentation and tasks completed during this encounter. I verify and attest these portions to be unchanged from prior visits.                Alissa Salgado, [address] [place] [address] Hematology and Oncology/Radiation Oncology   [address] [place] [address]  Office : [phone]  Fax : [phone]

## 2022-01-21 ENCOUNTER — HOSPITAL ENCOUNTER (OUTPATIENT)
Dept: LAB | Age: 67
Discharge: HOME | End: 2022-01-21
Payer: COMMERCIAL

## 2022-01-21 DIAGNOSIS — C50.112 MALIGNANT NEOPLASM OF CENTRAL PORTION OF LEFT BREAST IN FEMALE, ESTROGEN RECEPTOR POSITIVE (HCC): ICD-10-CM

## 2022-01-21 DIAGNOSIS — Z17.0 MALIGNANT NEOPLASM OF CENTRAL PORTION OF LEFT BREAST IN FEMALE, ESTROGEN RECEPTOR POSITIVE (HCC): ICD-10-CM

## 2022-01-21 LAB
ALBUMIN SERPL-MCNC: 3.7 G/DL (ref 3.2–4.6)
ALBUMIN/GLOB SERPL: 1.1 {RATIO} (ref 1.2–3.5)
ALP SERPL-CCNC: 81 U/L (ref 50–136)
ALT SERPL-CCNC: 27 U/L (ref 12–65)
ANION GAP SERPL CALC-SCNC: 6 MMOL/L (ref 7–16)
AST SERPL-CCNC: 21 U/L (ref 15–37)
BASOPHILS # BLD: 0 K/UL (ref 0–0.2)
BASOPHILS NFR BLD: 1 % (ref 0–2)
BILIRUB SERPL-MCNC: 0.5 MG/DL (ref 0.2–1.1)
BUN SERPL-MCNC: 20 MG/DL (ref 8–23)
CALCIUM SERPL-MCNC: 9.1 MG/DL (ref 8.3–10.4)
CHLORIDE SERPL-SCNC: 110 MMOL/L (ref 98–107)
CO2 SERPL-SCNC: 25 MMOL/L (ref 21–32)
CREAT SERPL-MCNC: 0.9 MG/DL (ref 0.6–1)
DIFFERENTIAL METHOD BLD: NORMAL
EOSINOPHIL # BLD: 0.2 K/UL (ref 0–0.8)
EOSINOPHIL NFR BLD: 4 % (ref 0.5–7.8)
ERYTHROCYTE [DISTWIDTH] IN BLOOD BY AUTOMATED COUNT: 13.5 % (ref 11.9–14.6)
GLOBULIN SER CALC-MCNC: 3.4 G/DL (ref 2.3–3.5)
GLUCOSE SERPL-MCNC: 95 MG/DL (ref 65–100)
HCT VFR BLD AUTO: 41.7 % (ref 35.8–46.3)
HGB BLD-MCNC: 13.4 G/DL (ref 11.7–15.4)
IMM GRANULOCYTES # BLD AUTO: 0 K/UL (ref 0–0.5)
IMM GRANULOCYTES NFR BLD AUTO: 0 % (ref 0–5)
LYMPHOCYTES # BLD: 0.9 K/UL (ref 0.5–4.6)
LYMPHOCYTES NFR BLD: 18 % (ref 13–44)
MCH RBC QN AUTO: 29.1 PG (ref 26.1–32.9)
MCHC RBC AUTO-ENTMCNC: 32.1 G/DL (ref 31.4–35)
MCV RBC AUTO: 90.7 FL (ref 79.6–97.8)
MONOCYTES # BLD: 0.6 K/UL (ref 0.1–1.3)
MONOCYTES NFR BLD: 11 % (ref 4–12)
NEUTS SEG # BLD: 3.3 K/UL (ref 1.7–8.2)
NEUTS SEG NFR BLD: 66 % (ref 43–78)
NRBC # BLD: 0 K/UL (ref 0–0.2)
PLATELET # BLD AUTO: 202 K/UL (ref 150–450)
PMV BLD AUTO: 9.9 FL (ref 9.4–12.3)
POTASSIUM SERPL-SCNC: 4 MMOL/L (ref 3.5–5.1)
PROT SERPL-MCNC: 7.1 G/DL (ref 6.3–8.2)
RBC # BLD AUTO: 4.6 M/UL (ref 4.05–5.2)
SODIUM SERPL-SCNC: 141 MMOL/L (ref 136–145)
WBC # BLD AUTO: 5.1 K/UL (ref 4.3–11.1)

## 2022-01-21 PROCEDURE — 36415 COLL VENOUS BLD VENIPUNCTURE: CPT

## 2022-01-21 PROCEDURE — 85025 COMPLETE CBC W/AUTO DIFF WBC: CPT

## 2022-01-21 PROCEDURE — 80053 COMPREHEN METABOLIC PANEL: CPT

## 2022-03-19 PROBLEM — Z17.0 MALIGNANT NEOPLASM OF LEFT BREAST IN FEMALE, ESTROGEN RECEPTOR POSITIVE (HCC): Status: ACTIVE | Noted: 2021-03-28

## 2022-03-19 PROBLEM — C50.912 MALIGNANT NEOPLASM OF LEFT BREAST IN FEMALE, ESTROGEN RECEPTOR POSITIVE (HCC): Status: ACTIVE | Noted: 2021-03-28

## 2022-06-30 RX ORDER — ANASTROZOLE 1 MG/1
TABLET ORAL
Qty: 30 TABLET | Refills: 5 | Status: SHIPPED | OUTPATIENT
Start: 2022-06-30

## 2022-07-07 ENCOUNTER — APPOINTMENT (OUTPATIENT)
Dept: RADIATION ONCOLOGY | Age: 67
End: 2022-07-07

## 2022-07-14 ENCOUNTER — HOSPITAL ENCOUNTER (OUTPATIENT)
Dept: RADIATION ONCOLOGY | Age: 67
Setting detail: RECURRING SERIES
Discharge: HOME | End: 2022-07-17
Payer: COMMERCIAL

## 2022-07-14 VITALS
SYSTOLIC BLOOD PRESSURE: 118 MMHG | TEMPERATURE: 98.1 F | DIASTOLIC BLOOD PRESSURE: 69 MMHG | OXYGEN SATURATION: 99 % | WEIGHT: 204.8 LBS | BODY MASS INDEX: 31.14 KG/M2 | HEART RATE: 71 BPM | RESPIRATION RATE: 18 BRPM

## 2022-07-14 DIAGNOSIS — C50.112 MALIGNANT NEOPLASM OF CENTRAL PORTION OF LEFT BREAST (HCC): Primary | ICD-10-CM

## 2022-07-14 PROCEDURE — 99211 OFF/OP EST MAY X REQ PHY/QHP: CPT

## 2022-07-14 RX ORDER — NEOMYCIN SULFATE, POLYMYXIN B SULFATE AND DEXAMETHASONE 3.5; 10000; 1 MG/ML; [USP'U]/ML; MG/ML
1 SUSPENSION/ DROPS OPHTHALMIC DAILY
COMMUNITY
Start: 2022-06-16

## 2022-07-14 NOTE — PROGRESS NOTES
Patient: Chrissy Petit  MRN: [id number]   SSN: [id number]          YOB: 1955   Age: 61 y.o. Sex: female         Other Providers:  Dr. 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 Continues to do well. She is on Arimidex per medical oncology. She is tolerating this well. She has no complaints today. No breast concerns. She denies skin issues. No respiratory issues and no GERD or dysphagia. She had mammogram 1/2022 that was benign and showed treatment related changes. Did recommend MRI follow-up due to breast density. 1/14/2022 Chrissy Petit  is a 61 y.o. female who is here  today for follow up,. She continues on Arimidex per medical oncology. tolerating well. No new breast concerns or changes. No difficulty with ROM. No difficulty with swallowing. Recovered well from therapy. No lingering skin changes related to radiation  therapy. Recent mammogram negative except for treatment related changes. UPDATED PAST MEDICAL HISTORY:  Since our prior encounter, Chrissy Petit has not been hospitalized. There have been no significant changes to  the medical history.        MEDICATIONS:        Current Outpatient Medications:     neomycin-polymyxin-dexameth (MAXITROL) 3.5-73962-5.1 ophthalmic suspension, Place 1 drop into the left eye daily, Disp: , Rfl:     anastrozole (ARIMIDEX) 1 MG tablet, TAKE 1 TABLET BY MOUTH DAILY, Disp: 30 tablet, Rfl: 5    Flaxseed, Linseed, (FLAXSEED OIL PO), Take by mouth, Disp: , Rfl:     calcium carbonate (OYSTER SHELL CALCIUM 500 MG) 1250 (500 Ca) MG tablet, Take by mouth daily, Disp: , Rfl:     ergocalciferol (ERGOCALCIFEROL) 1.25 MG (04442 UT) capsule, Take 50,000 Units by mouth, Disp: , Rfl:      ALLERGIES:     No Known Allergies        PHYSICAL EXAMINATION:    ECOG Performance status 0   VITAL SIGNS:    Visit Vitals    Vitals:    07/14/22 1311   BP: 118/69   Pulse: 71   Resp: 18   Temp: 98.1 °F (36.7 °C)   TempSrc: Oral   SpO2: 99%   Weight: 204 lb 12.8 oz (92.9 kg)           PHYSICAL EXAMINATION:    ECOG Performance status 0   VITAL SIGNS:    Visit Vitals    Vitals:    07/14/22 1311   BP: 118/69   Pulse: 71   Resp: 18   Temp: 98.1 °F (36.7 °C)   TempSrc: Oral   SpO2: 99%   Weight: 204 lb 12.8 oz (92.9 kg)       General: adult female in no acute distress; appears stated age  [de-identified]: normocephalic, atraumatic; EOMI;    Neck: supple with full ROM; Respiratory: normal inspiratory effort, no audible wheezes  Extremities: no cyanosis, clubbing, or edema  Musculoskeletal: mobility intact x4; normal ROM in all joints;   Skin: no skin lesions identified;  Neuro: AOx3; sensation intact  x 4; CNII-XII grossly intact  Psych: appropriate affect, insight, and judgement        LABORATORY:     No visits with results within 3 Day(s) from this visit. Latest known visit with results is:   Office Visit on 04/13/2022   Component Date Value Ref Range Status    DIAGNOSIS: 04/13/2022 Comment   Final    NEGATIVE FOR INTRAEPITHELIAL LESION OR MALIGNANCY.  Specimen adequacy: 04/13/2022 Comment   Final    Comment: Satisfactory for evaluation. Endocervical and/or squamous metaplastic  cells (endocervical component) are present.  Clinician Provided ICD10: 04/13/2022 Comment   Final    Z12.4    Performed by: 04/13/2022 Comment   Final    Radha Maher, Cytotechnologist (ASCP)   Walter Will [id number] 04/13/2022 . Final    Note: 04/13/2022 Comment   Final    Comment: The Pap smear is a screening test designed to aid in the detection of  premalignant and malignant conditions of the uterine cervix. It is not a  diagnostic procedure and should not be used as the sole means of detecting  cervical cancer. Both false-positive and false-negative reports do occur.       Methodology 04/13/2022 Comment   Final    Comment: This liquid based ThinPrep(R) pap test was screened with the  use of an image guided system. Ambar Whalen, [id number] 04/13/2022 Comment   Final    Comment: The HPV DNA reflex criteria were not met with this specimen result  therefore, no HPV testing was performed. RADIOLOGY:   [facility] 3D BENY W MAMMO BI DX INCL CAD      Result Date: 1/4/2022   DIAGNOSTIC DIGITAL BILATERAL TOMOSYNTHESIS MAMMOGRAPHY CLINICAL INDICATION: Annual bilateral mammography due, follow-up of previous left lumpectomy for breast cancer in April 2021, currently 6 months after completing radiation. No new problems or symptoms  reported. Current antihormone therapy. Her mother and maternal grandmother had breast cancer. COMPARISON: 4/16/2021 and multiple others going back to 6/3/2013 FINDINGS: Mammogram: Digital diagnostic mammography was performed, and is interpreted in conjunction  with a computer assisted detection (CAD) system. Standard views and Tomosynthesis bilaterally demonstrate heterogeneously dense glandular tissues which can limit sensitivity for lesions. Additional left mediolateral view performed. Stable diffuse bilateral  typically benign calcifications with no suspicious developing cluster. Mild expected postoperative and radiation changes including scarring, distortion, edema, skin thickening on the left. No suspicious developing mass, nonsurgical distortion or nipple  retraction on either side. 1. Left expected lumpectomy and radiation change . 2. No evidence of residual or recurrent malignancy. Recommend screening mammography in one year. 3. Breast MRI would follow-up is due in March 2022 (as recommended on 3/15/2021 breast MRI). This would  also be beneficial given dense complex postoperative breast parenchyma and elevated risk with the patient's personal and strong family history of breast cancer.  Results today were reported to the patient at the time of interpretation. BI-RADS Assessment  Category 2: Benign finding BD3 We are including breast density information for the patient along with the mammogram report. IMPRESSION:  Campbell Reyes is a  61 y.o. female who has recovered well from therapy and is without any complaints today. PLAN:    - Mammogram from 1/2022 reviewed and no evidence of malignancy with only treatment related changes. Does recommend breast MRI due to breast density (last completed 3/2021). Will order for 18 month post-treatment follow-up. - Continue arimidex and follow-up per medical oncology  - RTC in 6 months. Call with concerns or questions. Portions of this note were copied from prior encounters and reviewed for accuracy, currency, and represent documentation and tasks completed during this encounter. I verify and attest these portions to be unchanged from prior visits.            Amy Dobson, APRN - [facility] Radiation Oncology  [address] [place] [address], [address] [place] [address]  Office : [phone]

## 2022-07-14 NOTE — PROGRESS NOTES
MRI bilateral breasts ordered by NP per Radiology recommendation for 11/22. John Neal.  Eulalio Gillette

## 2022-07-14 NOTE — PROGRESS NOTES
6 Month Follow Up    07/22/2022 - Med. Onc.  Follow Up (Dr. Tabares )    RT End: 06/16/2021 x 15 fractions    3D Mammo (01/04/2022): Negative      Adry Calderón CMA

## 2022-07-19 DIAGNOSIS — Z17.0 MALIGNANT NEOPLASM OF LEFT BREAST IN FEMALE, ESTROGEN RECEPTOR POSITIVE, UNSPECIFIED SITE OF BREAST (HCC): Primary | ICD-10-CM

## 2022-07-19 DIAGNOSIS — C50.912 MALIGNANT NEOPLASM OF LEFT BREAST IN FEMALE, ESTROGEN RECEPTOR POSITIVE, UNSPECIFIED SITE OF BREAST (HCC): Primary | ICD-10-CM

## 2022-08-29 ENCOUNTER — HOSPITAL ENCOUNTER (EMERGENCY)
Age: 67
Discharge: HOME | End: 2022-08-29
Attending: EMERGENCY MEDICINE | Admitting: EMERGENCY MEDICINE
Payer: COMMERCIAL

## 2022-08-29 ENCOUNTER — HOSPITAL ENCOUNTER (EMERGENCY)
Dept: CT IMAGING | Age: 67
Discharge: HOME | End: 2022-09-01
Payer: COMMERCIAL

## 2022-08-29 VITALS
BODY MASS INDEX: 30.31 KG/M2 | OXYGEN SATURATION: 97 % | RESPIRATION RATE: 16 BRPM | HEIGHT: 68 IN | SYSTOLIC BLOOD PRESSURE: 135 MMHG | HEART RATE: 67 BPM | WEIGHT: 200 LBS | TEMPERATURE: 98.5 F | DIASTOLIC BLOOD PRESSURE: 66 MMHG

## 2022-08-29 DIAGNOSIS — S01.01XA LACERATION OF SCALP, INITIAL ENCOUNTER: ICD-10-CM

## 2022-08-29 DIAGNOSIS — S01.81XA FACIAL LACERATION, INITIAL ENCOUNTER: Primary | ICD-10-CM

## 2022-08-29 PROCEDURE — 70450 CT HEAD/BRAIN W/O DYE: CPT

## 2022-08-29 PROCEDURE — 12004 RPR S/N/AX/GEN/TRK7.6-12.5CM: CPT | Performed by: EMERGENCY MEDICINE

## 2022-08-29 PROCEDURE — 99284 EMERGENCY DEPT VISIT MOD MDM: CPT | Performed by: EMERGENCY MEDICINE

## 2022-08-29 ASSESSMENT — PAIN SCALES - GENERAL: PAINLEVEL_OUTOF10: 5

## 2022-08-29 ASSESSMENT — PAIN - FUNCTIONAL ASSESSMENT: PAIN_FUNCTIONAL_ASSESSMENT: 0-10

## 2022-08-29 NOTE — ED PROVIDER NOTES
[facility] Emergency Department Provider Note                   PCP:                Alyssa Knight MD               Age: 61 y.o. Sex: female       ICD-10-CM    1. Facial laceration, initial encounter  S01.81XA       2. Laceration of scalp, initial encounter  S01. 01XA           DISPOSITION Decision To Discharge 08/29/2022 11:49:32 AM       New Prescriptions    No medications on file       Orders Placed This Encounter   Procedures    [address]         Genie Serra is a 61 y.o. female who presents to the Emergency Department with chief complaint of    Chief Complaint   Patient presents with    Laceration      Patient to ER status post fall at her home. She tripped over a cord and fell on her stairs. She hit her head no loss of consciousness but does have a laceration to the forehead and scalp. She is no any blood thinners. She has a mild headache. No nausea no blurred vision no numbness or tingling. She states she is up-to-date on her tetanus her  was home at the time and is here with her now feels she is at her normal mentation        Review of Systems   All other systems reviewed and are negative. All other systems reviewed and are negative. Past Medical History:   Diagnosis Date    Abnormal Pap smear     Cancer of left breast (HCC)     Chicken pox     Cyst of breast     Left and right breast cysts.     Measles     Menopause     Mumps     Radiation therapy complication         Past Surgical History:   Procedure Laterality Date    BREAST BIOPSY Left 4/16/2021    LEFT BREAST NEEDLE LOCALIZED LUMPECTOMY performed by Sofia Mcintyre MD at [facility] Left 03/18/2021    left retroareolar assymetry    CERVIX BIOPSY      around 2003    [facility] BREAST BIOPSY LEFT Left 3/18/2021    [facility] BREAST BIOPSY LEFT 3/18/2021 [facility] MAMMO        Family History   Problem Relation Age of Onset    Hypertension Paternal Grandfather     Osteoporosis Other     Diabetes Maternal Grandfather     Ovarian Cancer Neg Hx     Thyroid Disease Mother     Breast Cancer Mother 62    Heart Disease Paternal Grandfather     Colon Cancer Neg Hx         Social Connections: Not on file        No Known Allergies     Vitals signs and nursing note reviewed. Patient Vitals for the past 4 hrs:   Temp Pulse Resp BP SpO2   08/29/22 1021 98.5 °F (36.9 °C) 67 16 135/66 97 %          Physical Exam  Vitals and nursing note reviewed. Constitutional:       Appearance: Normal appearance. She is normal weight. HENT:      Head: Normocephalic. Comments: 8 cm laceration that extends from the upper mid forehead area into the scalp. Slight oozing noted. Right Ear: External ear normal.      Left Ear: External ear normal.      Nose: Nose normal.      Mouth/Throat:      Mouth: Mucous membranes are moist.      Pharynx: Oropharynx is clear. Eyes:      Extraocular Movements: Extraocular movements intact. Pupils: Pupils are equal, round, and reactive to light. Cardiovascular:      Rate and Rhythm: Normal rate and regular rhythm. Pulses: Normal pulses. Heart sounds: Normal heart sounds. Pulmonary:      Effort: Pulmonary effort is normal.      Breath sounds: Normal breath sounds. No rales. Chest:      Chest wall: No tenderness. Abdominal:      Tenderness: There is no abdominal tenderness. Hernia: No hernia is present. Musculoskeletal:         General: Normal range of motion. Cervical back: Normal range of motion and neck supple. Skin:     General: Skin is warm and dry. Neurological:      General: No focal deficit present. Mental Status: She is alert and oriented to person, place, and time. Cranial Nerves: No cranial nerve deficit. Motor: No weakness.       Gait: Gait normal.   Psychiatric:         Mood and Affect: Mood normal.         Behavior: Behavior normal.        MDM  Number of Diagnoses or Management Options  Facial laceration, initial encounter  Laceration of scalp, initial encounter  Diagnosis management comments: CT HEAD WO CONTRAST   Final Result        1. No acute intracranial abnormality. 2. Forehead laceration without evidence of fracture or radiopaque foreign body. 8 cm laceration to the forehead and scalp repaired with sutures and staples. Patient advised to use Tylenol and Motrin for pain keep area clean may gently wash hair with soap and water.   Sutures and staples to be removed in 10 days May return to ER or primary care           Amount and/or Complexity of Data Reviewed  Tests in the radiology section of CPT®: ordered and reviewed  Review and summarize past medical records: yes  Discuss the patient with other providers: yes  Independent visualization of images, tracings, or specimens: yes    Risk of Complications, Morbidity, and/or Mortality  Presenting problems: moderate  Diagnostic procedures: moderate  Management options: moderate    Patient Progress  Patient progress: improved      Lac Repair    Date/Time: 8/29/2022 11:46 AM  Performed by: CHRISTIE Gill  Authorized by: Blanco Jackson MD     Consent:     Consent obtained:  Verbal    Consent given by:  Patient    Risks, benefits, and alternatives were discussed: yes      Risks discussed:  Infection and poor cosmetic result    Alternatives discussed:  Referral  [employer] protocol:     Procedure explained and questions answered to patient or proxy's satisfaction: yes      Test results available: yes      Imaging studies available: yes      Patient identity confirmed:  Verbally with patient  Anesthesia:     Anesthesia method:  Local infiltration    Local anesthetic:  Lidocaine 1% WITH epi  Laceration details:     Location:  Scalp    Scalp location:  Mid-scalp    Length (cm):  8    Depth (mm):  3  Pre-procedure details:     Preparation:  Patient was prepped and draped in usual sterile fashion  Exploration:     Limited defect created (wound extended): no      Hemostasis achieved with:  Epinephrine    Imaging obtained comment:  Head ct    Wound extent: no fascia violation noted, no foreign bodies/material noted, no nerve damage noted, no tendon damage noted and no underlying fracture noted      Contaminated: no    Treatment:     Area cleansed with:  Povidone-iodine and Shur-Clens    Amount of cleaning:  Standard    Irrigation solution:  Sterile saline    Visualized foreign bodies/material removed: no      Debridement:  None    Undermining:  None    Scar revision: no    Skin repair:     Repair method:  Sutures and staples    Suture size:  4-0    Suture material:  Prolene    Suture technique:  Simple interrupted    Number of sutures:  7    Number of staples:  7  Approximation:     Approximation:  Close  Repair type:     Repair type:  Simple  Post-procedure details:     Dressing:  Open (no dressing)    Procedure completion:  Tolerated well, no immediate complications    Labs Reviewed - No data to display     CT HEAD WO CONTRAST   Final Result      1. No acute intracranial abnormality. 2. Forehead laceration without evidence of fracture or radiopaque foreign body. [place] Coma Scale  Eye Opening: Spontaneous  Best Verbal Response: Oriented  Best Motor Response: Obeys commands  [place] Coma Scale Score: 15                     Voice dictation software was used during the making of this note. This software is not perfect and grammatical and other typographical errors may be present. This note has not been completely proofread for errors.      CHRISTIE Estrella  08/29/22 [address] [place]  08/29/22 4504

## 2022-08-29 NOTE — ED NOTES
I have reviewed discharge instructions with the patient. The patient verbalized understanding. Patient left ED via Discharge Method: ambulatory to Home with family. Opportunity for questions and clarification provided. Patient given 0 scripts. To continue your aftercare when you leave the hospital, you may receive an automated call from our care team to check in on how you are doing. This is a free service and part of our promise to provide the best care and service to meet your aftercare needs.  If you have questions, or wish to unsubscribe from this service please call [phone]. Thank you for Choosing our [facility] Emergency Department.           Louisa Hernández RN  08/29/22 [id number]

## 2022-08-29 NOTE — DISCHARGE INSTRUCTIONS
May gently wash hair with soap and water. Use Tylenol for pain ice for any swelling.   Return to ER for any strokelike symptoms sutures and staples to be removed in 10 days may follow-up with primary care physician or ER

## 2022-08-29 NOTE — ED TRIAGE NOTES
Pt got caught up in the cord of the vacuum while taking it up stairs. Pt hit head on stair. Pt with laceration to right forehead. Bleeding controlled.

## 2022-09-07 ENCOUNTER — HOSPITAL ENCOUNTER (EMERGENCY)
Age: 67
Discharge: HOME | End: 2022-09-07
Attending: EMERGENCY MEDICINE

## 2022-09-07 VITALS
HEIGHT: 68 IN | BODY MASS INDEX: 30.31 KG/M2 | TEMPERATURE: 98.4 F | DIASTOLIC BLOOD PRESSURE: 68 MMHG | WEIGHT: 200 LBS | HEART RATE: 66 BPM | OXYGEN SATURATION: 98 % | SYSTOLIC BLOOD PRESSURE: 117 MMHG | RESPIRATION RATE: 18 BRPM

## 2022-09-07 DIAGNOSIS — Z48.02 VISIT FOR SUTURE REMOVAL: Primary | ICD-10-CM

## 2022-09-07 PROCEDURE — 4500000002 HC ER NO CHARGE

## 2022-09-07 ASSESSMENT — ENCOUNTER SYMPTOMS
VOMITING: 0
NAUSEA: 0
SHORTNESS OF BREATH: 0

## 2022-09-07 NOTE — DISCHARGE INSTRUCTIONS
I removed 5 staples and 7 sutures from your scalp today. You still have 2 staples in place because I do not want the wound to separate by removing these today. I would wait about 2-3 days to have them removed. Continue keeping the area clean and dry, you may wash your hair but be mindful of not scrubbing this area vigorously to limit irritation to the newly healed skin. Follow-up with your PCP in 1 to 2 days if no improvement. Return to the ER for any new or worsening symptoms.

## 2022-09-07 NOTE — ED PROVIDER NOTES
Emergency Department Provider Note                   PCP:                Eleazar Dykes MD               Age: 61 y.o. Sex: female       ICD-10-CM    1. Visit for suture removal  Z48.02           DISPOSITION Decision To Discharge 09/07/2022 09:31:37 AM        MDM  Number of Diagnoses or Management Options  Visit for suture removal  Diagnosis management comments:     Overall patient is a well-appearing 62-year-old female who presented today for suture and staple removal.  Wound was initially repaired on 8/29. On exam wound appears to be well-healing, there are no signs of secondary infection. The 7 sutures were removed without difficulty. 5 of the 7 staples were removed from the posterior aspect of the wound working more anterior, however the 2 most anterior staples were left in place due to concerns for possible wound dehiscence at this site if staples were removed today. Discussed with patient I would recommend leaving these in place for an additional 2 to 3 days and then they can be removed. Patient in agreement with the treatment plan. Amount and/or Complexity of Data Reviewed  Review and summarize past medical records: yes         Orders Placed This Encounter   Procedures    SUTURE REMOVAL        Medications - No data to display    Discharge Medication List as of 9/7/2022 10:17 AM           Pearl Mckeon is a 61 y.o. female who presents to the Emergency Department with chief complaint of    Chief Complaint   Patient presents with    Suture / Staple Removal      62-year-old female presents today for suture/staple removal.  Patient was seen here on 8/29 after fall and laceration to the forehead extending into the scalp on the right frontal portion of her head. Per chart review the wound was repaired with 7 sutures on the forehead, 7 staples holding the scalp laceration together. Patient is here for removal today.   She states she is been compliant with previous discharge instructions and has no acute complaints. She is does admit to hitting her head while getting out of her car the other day and is unsure if this caused any issues. The history is provided by the patient. No  was used. Review of Systems   Constitutional:  Negative for activity change, chills and fever. Respiratory:  Negative for shortness of breath. Gastrointestinal:  Negative for nausea and vomiting. Skin:  Positive for wound. Neurological:  Negative for dizziness and headaches. Past Medical History:   Diagnosis Date    Abnormal Pap smear     Cancer of left breast (HCC)     Chicken pox     Cyst of breast     Left and right breast cysts. Measles     Menopause     Mumps     Radiation therapy complication         Past Surgical History:   Procedure Laterality Date    BREAST BIOPSY Left 4/16/2021    LEFT BREAST NEEDLE LOCALIZED LUMPECTOMY performed by Saul Dodson MD at [facility] Left 03/18/2021    left retroareolar assymetry    CERVIX BIOPSY      around 2003    [facility] BREAST BIOPSY LEFT Left 3/18/2021    [facility] BREAST BIOPSY LEFT 3/18/2021 [facility]        Family History   Problem Relation Age of Onset    Hypertension Paternal Grandfather     Osteoporosis Other     Diabetes Maternal Grandfather     Ovarian Cancer Neg Hx     Thyroid Disease Mother     Breast Cancer Mother 62    Heart Disease Paternal Grandfather     Colon Cancer Neg Hx         Social History     Socioeconomic History    Marital status:      Spouse name: None    Number of children: None    Years of education: None    Highest education level: None   Tobacco Use    Smoking status: Never    Smokeless tobacco: Never   Substance and Sexual Activity    Alcohol use: Yes     Alcohol/week: 1.0 standard drink    Drug use: Never         Patient has no known allergies.      Discharge Medication List as of 9/7/2022 10:17 AM        CONTINUE these medications which have NOT CHANGED    Details   neomycin-polymyxin-dexameth (MAXITROL) 3.5-92784-0.1 ophthalmic suspension Place 1 drop into the left eye dailyHistorical Med      anastrozole (ARIMIDEX) 1 MG tablet TAKE 1 TABLET BY MOUTH DAILY, Disp-30 tablet, R-5Normal      Flaxseed, Linseed, (FLAXSEED OIL PO) Take by mouthHistorical Med      calcium carbonate (OYSTER SHELL CALCIUM 500 MG) 1250 (500 Ca) MG tablet Take by mouth dailyHistorical Med      ergocalciferol (ERGOCALCIFEROL) 1.25 MG (24627 UT) capsule Take 50,000 Units by mouthHistorical Med              Vitals signs and nursing note reviewed. Patient Vitals for the past 4 hrs:   Temp Pulse Resp BP SpO2   09/07/22 0915 98.4 °F (36.9 °C) 66 18 117/68 98 %          Physical Exam  Vitals and nursing note reviewed. Constitutional:       General: She is not in acute distress. Appearance: Normal appearance. HENT:      Head: Normocephalic. Nose: Nose normal.   Eyes:      Conjunctiva/sclera: Conjunctivae normal.   Pulmonary:      Effort: Pulmonary effort is normal.   Skin:     General: Skin is warm and dry. Capillary Refill: Capillary refill takes less than 2 seconds. Neurological:      General: No focal deficit present. Mental Status: She is alert and oriented to person, place, and time. Gait: Gait normal.   Psychiatric:         Mood and Affect: Mood normal.         Thought Content: Thought content normal.         Judgment: Judgment normal.        Suture Removal    Date/Time: 9/7/2022 10:27 AM  Performed by: CHRISTIE Walter  Authorized by:  Boris Armstrong MD     Consent:     Consent obtained:  Verbal    Consent given by:  Patient    Risks discussed:  Bleeding, pain and wound separation  Universal protocol:     Patient identity confirmed:  Verbally with patient  Location:     Location:  [address] [facility] location:  Forehead  Procedure details:     Wound appearance:  No signs of infection, good wound healing and clean    Number of sutures removed:  7

## 2022-09-07 NOTE — ED TRIAGE NOTES
Pt had some staples/sutures put in her head about 9 days ago after falling down some stairs. Pt here to have them removed. Pt denies new issues.

## 2022-10-10 ENCOUNTER — OFFICE VISIT (OUTPATIENT)
Dept: ORTHOPEDIC SURGERY | Age: 67
End: 2022-10-10
Payer: COMMERCIAL

## 2022-10-10 DIAGNOSIS — M25.562 CHRONIC PAIN OF LEFT KNEE: Primary | ICD-10-CM

## 2022-10-10 DIAGNOSIS — M17.12 OSTEOARTHRITIS OF LEFT KNEE, UNSPECIFIED OSTEOARTHRITIS TYPE: ICD-10-CM

## 2022-10-10 DIAGNOSIS — G89.29 CHRONIC PAIN OF LEFT KNEE: Primary | ICD-10-CM

## 2022-10-10 PROCEDURE — 99203 OFFICE O/P NEW LOW 30 MIN: CPT | Performed by: ORTHOPAEDIC SURGERY

## 2022-10-10 NOTE — PROGRESS NOTES
Name: Coleman Ha  YOB: 1955  Gender: female  MRN: [id number]    CC:   Chief Complaint   Patient presents with    Knee Pain     Left          HPI:   The pain has been present for several weeks with some improvement over the past week. It hurts at night when sleeping. The pain is located over the knee. It does hurt to walk and gets worse with increased distances. The pain does not radiate down the leg. Numbness and tingling are not noted. Treatment so far has been cortisone and HP over 5 years ago which worked well. Current Outpatient Medications:     neomycin-polymyxin-dexameth (MAXITROL) 3.5-10961-8.1 ophthalmic suspension, Place 1 drop into the left eye daily, Disp: , Rfl:     anastrozole (ARIMIDEX) 1 MG tablet, TAKE 1 TABLET BY MOUTH DAILY, Disp: 30 tablet, Rfl: 5    Flaxseed, Linseed, (FLAXSEED OIL PO), Take by mouth, Disp: , Rfl:     calcium carbonate (OYSTER SHELL CALCIUM 500 MG) 1250 (500 Ca) MG tablet, Take by mouth daily, Disp: , Rfl:     ergocalciferol (ERGOCALCIFEROL) 1.25 MG (78092 UT) capsule, Take 50,000 Units by mouth, Disp: , Rfl:   No Known Allergies  Past Medical History:   Diagnosis Date    Abnormal Pap smear     Cancer of left breast (HCC)     Chicken pox     Cyst of breast     Left and right breast cysts. Measles     Menopause     Mumps     Radiation therapy complication      . pmh  Past Surgical History:   Procedure Laterality Date    BREAST BIOPSY Left 4/16/2021    LEFT BREAST NEEDLE LOCALIZED LUMPECTOMY performed by Debbie Azevedo MD at [facility] Left 03/18/2021    left retroareolar assymetry    CERVIX BIOPSY      around 2003    [facility] BREAST BIOPSY LEFT Left 3/18/2021    [facility] BREAST BIOPSY LEFT 3/18/2021 [facility]     Family History   Problem Relation Age of Onset    Hypertension Paternal Grandfather     Osteoporosis Other     Diabetes Maternal Grandfather     Ovarian Cancer Neg Hx     Thyroid Disease Mother     Breast Cancer Mother 62    Heart Disease Paternal Grandfather     Colon Cancer Neg Hx      Social History     Socioeconomic History    Marital status:      Spouse name: Not on file    Number of children: Not on file    Years of education: Not on file    Highest education level: Not on file   Occupational History    Not on file   Tobacco Use    Smoking status: Never    Smokeless tobacco: Never   Substance and Sexual Activity    Alcohol use: Yes     Alcohol/week: 1.0 standard drink    Drug use: Never    Sexual activity: Not on file     Comment: vasectomy   Other Topics Concern    Not on file   Social History Narrative    Not on file     Social Determinants of Health     Financial Resource Strain: Not on file   Food Insecurity: Not on file   Transportation Needs: Not on file   Physical Activity: Not on file   Stress: Not on file   Social Connections: Not on file   Intimate Partner Violence: Not on file   Housing Stability: Not on file       Review of Systems:  As per HPI. Pertinent positives and negatives are addressed with the patient, particularly those related to musculoskeletal concerns. Non-orthopaedic concerns were referred back to the primary care physician. PHYSICAL EXAMINATION:   The patient appears their stated age and they are in no distress. The lower extremities are as described below. Circulation is normal with palpable pedal pulses bilaterally and no edema. There is no lymph adenopathy in the popliteal or malleolar region. The skin is without stasis disease distally bilaterally. Hip ROM is smooth and painless. Knee range of motion is 0-120 degrees on the right and 0-120 degrees on the left. left knee: There is 2mm of anterior/posterior translation and 2mm of medial/lateral instability bilaterally. There is 2 degrees of varus alignment in the left knee. There is some pain to palpation over the medial joint line.   Limb lengths are equal.  The gait is noted to be with a slight trendelenburg and antalgia. Straight leg test is negative. Quadriceps strength is good. Sensation is intact to light touch bilaterally. Their judgment and insight are normal.  They are oriented to time, place and person. Their memory is good and the mood and affect appropriate. X-RAY: Views of the left knee are reviewed. 4 views standing reveal some joint space loss, eburnated bone, and osteophyte formation present. X-ray impression:  Moderate left osteoarthritis of the knee    IMPRESSION:    Diagnosis Orders   1. Chronic pain of left knee  XR KNEE LEFT (MIN 4 VIEWS)      2. Osteoarthritis of left knee, unspecified osteoarthritis type        . RECOMMENDATIONS:    Reviewed x-ray findings with the patient. The concerns with functional limitations are increasing and response is variable with conservative measures. Surgery was discussed today with the patient. They are not limited to warrant any type of surgical consideration. We will additionally try injection therapies as we process management of this progressive and chronic condition. Patient desires to start left knee HP but we will need to obtain approval for this. She will return in 2 weeks to start HP series. Approximately 30 minutes was spent reviewing the medical record, imaging, performing history and physical examination, discussing the diagnosis and treatment plan with the patient, and completing documentation for this visit. Is really feeling quite well well. We will anticipate starting HP in 2 weeks.   If she is not hurting she knows she has a 90-day window to try to get these shots entertained with the approval.

## 2022-11-14 ENCOUNTER — OFFICE VISIT (OUTPATIENT)
Dept: ORTHOPEDIC SURGERY | Age: 67
End: 2022-11-14
Payer: COMMERCIAL

## 2022-11-14 DIAGNOSIS — M17.12 LOCALIZED OSTEOARTHRITIS OF LEFT KNEE: Primary | ICD-10-CM

## 2022-11-14 PROCEDURE — 20611 DRAIN/INJ JOINT/BURSA W/US: CPT | Performed by: ORTHOPAEDIC SURGERY

## 2022-11-14 PROCEDURE — 99999 PR OFFICE/OUTPT VISIT,PROCEDURE ONLY: CPT | Performed by: ORTHOPAEDIC SURGERY

## 2022-11-14 RX ORDER — HYALURONATE SODIUM 10 MG/ML
20 SYRINGE (ML) INTRAARTICULAR WEEKLY
Status: SHIPPED | OUTPATIENT
Start: 2022-11-14

## 2022-11-14 RX ADMIN — Medication 20 MG: at 13:06

## 2022-11-14 NOTE — PROGRESS NOTES
Name: Leslye Anderson  YOB: 1955  Gender: female  MRN: [id number]      Current Outpatient Medications:     neomycin-polymyxin-dexameth (MAXITROL) 3.5-44607-3.1 ophthalmic suspension, Place 1 drop into the left eye daily, Disp: , Rfl:     anastrozole (ARIMIDEX) 1 MG tablet, TAKE 1 TABLET BY MOUTH DAILY, Disp: 30 tablet, Rfl: 5    Flaxseed, Linseed, (FLAXSEED OIL PO), Take by mouth, Disp: , Rfl:     calcium carbonate (OYSTER SHELL CALCIUM 500 MG) 1250 (500 Ca) MG tablet, Take by mouth daily, Disp: , Rfl:     ergocalciferol (ERGOCALCIFEROL) 1.25 MG (08788 UT) capsule, Take 50,000 Units by mouth, Disp: , Rfl:   No Known Allergies    CC: Left Knee Pain     PROCEDURE: 1 of 3     DIAGNOSIS:   Encounter Diagnosis   Name Primary? Localized osteoarthritis of left knee Yes        Yoomly US unit with variable frequency (6.0-15.0 MHz) linear transducer was used to visualize the retropatellar fat pad, patella tendon, patella, tibia, and to ensure proper intra-articular needle placement. Injection image was saved to patient's permanent chart. Procedure Note: The patient was placed in upright position with knee hanging freely from exam table. The left knee was prepped in sterile fashion using alcohol wipe. Using Mindray ultrasound guidance, a 22 gauge needle was then introduced into the knee joint from an infrapatellar approach and 2 mL of Euflexxa was injected freely. The needle was then removed, pressure hemostatis achieved, injection site was cleansed with alcohol wipe and dressed with band aid. The patient tolerated the procedure without complication. The patient  will follow up as scheduled.

## 2022-11-21 ENCOUNTER — OFFICE VISIT (OUTPATIENT)
Dept: ORTHOPEDIC SURGERY | Age: 67
End: 2022-11-21
Payer: COMMERCIAL

## 2022-11-21 DIAGNOSIS — M17.12 LOCALIZED OSTEOARTHRITIS OF LEFT KNEE: Primary | ICD-10-CM

## 2022-11-21 PROCEDURE — 99999 PR OFFICE/OUTPT VISIT,PROCEDURE ONLY: CPT | Performed by: ORTHOPAEDIC SURGERY

## 2022-11-21 RX ORDER — HYALURONATE SODIUM 10 MG/ML
20 SYRINGE (ML) INTRAARTICULAR ONCE
Status: COMPLETED | OUTPATIENT
Start: 2022-11-21 | End: 2022-11-21

## 2022-11-21 RX ADMIN — Medication 20 MG: at 13:14

## 2022-11-21 NOTE — PROGRESS NOTES
Name: Adwoa Allen  YOB: 1955  Gender: female  MRN: [id number]      Current Outpatient Medications:     neomycin-polymyxin-dexameth (MAXITROL) 3.5-61246-0.1 ophthalmic suspension, Place 1 drop into the left eye daily, Disp: , Rfl:     anastrozole (ARIMIDEX) 1 MG tablet, TAKE 1 TABLET BY MOUTH DAILY, Disp: 30 tablet, Rfl: 5    Flaxseed, Linseed, (FLAXSEED OIL PO), Take by mouth, Disp: , Rfl:     calcium carbonate (OYSTER SHELL CALCIUM 500 MG) 1250 (500 Ca) MG tablet, Take by mouth daily, Disp: , Rfl:     ergocalciferol (ERGOCALCIFEROL) 1.25 MG (51526 UT) capsule, Take 50,000 Units by mouth, Disp: , Rfl:   No Known Allergies    CC: Left Knee Pain     PROCEDURE: 2 of 3     DIAGNOSIS:   Encounter Diagnosis   Name Primary? Localized osteoarthritis of left knee Yes        BlueVine US unit with variable frequency (6.0-15.0 MHz) linear transducer was used to visualize the retropatellar fat pad, patella tendon, patella, tibia, and to ensure proper intra-articular needle placement. Injection image was saved to patient's permanent chart. Procedure Note: The patient was placed in upright position with knee hanging freely from exam table. The left knee was prepped in sterile fashion using alcohol wipe. Using Mindray ultrasound guidance, a 22 gauge needle was then introduced into the knee joint from an infrapatellar approach and 2 mL of Euflexxa was injected freely. The needle was then removed, pressure hemostatis achieved, injection site was cleansed with alcohol wipe and dressed with band aid. The patient tolerated the procedure without complication. The patient  will follow up as scheduled.

## 2022-11-28 ENCOUNTER — OFFICE VISIT (OUTPATIENT)
Dept: ORTHOPEDIC SURGERY | Age: 67
End: 2022-11-28

## 2022-11-28 DIAGNOSIS — M17.12 LOCALIZED OSTEOARTHRITIS OF LEFT KNEE: Primary | ICD-10-CM

## 2022-11-28 RX ORDER — HYALURONATE SODIUM 10 MG/ML
20 SYRINGE (ML) INTRAARTICULAR ONCE
Status: COMPLETED | OUTPATIENT
Start: 2022-11-28 | End: 2022-11-28

## 2022-11-28 RX ADMIN — Medication 20 MG: at 13:05

## 2022-11-28 NOTE — PROGRESS NOTES
Name: Chinedu Olivas  YOB: 1955  Gender: female  MRN: [id number]      Current Outpatient Medications:     neomycin-polymyxin-dexameth (MAXITROL) 3.5-88106-9.1 ophthalmic suspension, Place 1 drop into the left eye daily, Disp: , Rfl:     anastrozole (ARIMIDEX) 1 MG tablet, TAKE 1 TABLET BY MOUTH DAILY, Disp: 30 tablet, Rfl: 5    Flaxseed, Linseed, (FLAXSEED OIL PO), Take by mouth, Disp: , Rfl:     calcium carbonate (OYSTER SHELL CALCIUM 500 MG) 1250 (500 Ca) MG tablet, Take by mouth daily, Disp: , Rfl:     ergocalciferol (ERGOCALCIFEROL) 1.25 MG (38154 UT) capsule, Take 50,000 Units by mouth, Disp: , Rfl:   No Known Allergies    CC: Left Knee Pain     PROCEDURE: 3 of 3     DIAGNOSIS:   Encounter Diagnosis   Name Primary? Localized osteoarthritis of left knee Yes        Mosso US unit with variable frequency (6.0-15.0 MHz) linear transducer was used to visualize the retropatellar fat pad, patella tendon, patella, tibia, and to ensure proper intra-articular needle placement. Injection image was saved to patient's permanent chart. Procedure Note: The patient was placed in upright position with knee hanging freely from exam table. The left knee was prepped in sterile fashion using alcohol wipe. Using Mindray ultrasound guidance, a 22 gauge needle was then introduced into the knee joint from an infrapatellar approach and 2 mL of Euflexxa was injected freely. The needle was then removed, pressure hemostatis achieved, injection site was cleansed with alcohol wipe and dressed with band aid. The patient tolerated the procedure without complication. The patient  will follow up as scheduled.

## 2023-01-05 ENCOUNTER — HOSPITAL ENCOUNTER (OUTPATIENT)
Dept: RADIATION ONCOLOGY | Age: 68
Setting detail: RECURRING SERIES
Discharge: HOME | End: 2023-01-08
Payer: COMMERCIAL

## 2023-01-05 VITALS
DIASTOLIC BLOOD PRESSURE: 84 MMHG | SYSTOLIC BLOOD PRESSURE: 122 MMHG | OXYGEN SATURATION: 95 % | HEART RATE: 63 BPM | WEIGHT: 212.1 LBS | TEMPERATURE: 98.4 F | RESPIRATION RATE: 16 BRPM | BODY MASS INDEX: 32.25 KG/M2

## 2023-01-05 DIAGNOSIS — C50.912 MALIGNANT NEOPLASM OF LEFT BREAST IN FEMALE, ESTROGEN RECEPTOR POSITIVE, UNSPECIFIED SITE OF BREAST (HCC): Primary | ICD-10-CM

## 2023-01-05 DIAGNOSIS — Z17.0 MALIGNANT NEOPLASM OF LEFT BREAST IN FEMALE, ESTROGEN RECEPTOR POSITIVE, UNSPECIFIED SITE OF BREAST (HCC): Primary | ICD-10-CM

## 2023-01-05 PROCEDURE — 99211 OFF/OP EST MAY X REQ PHY/QHP: CPT

## 2023-01-05 NOTE — PROGRESS NOTES
Patient: Bill Fitzpatrick  MRN: [id number]   SSN: [id number]          YOB: 1955   Age: 61 y.o. Sex: female         Other Providers:  Dr. 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 Continues to do well. She reported recent pain in L breast that radiated medially from around 9 o'clock at the nipple. This lasted about a week and has since resolved spontaneously. No lumps or bumps and no skin changes. No lymphedema or issues with ROM. 7/14/22 Here today for 6-month follow-up. Continues to do well. She is on Arimidex per medical oncology. She is tolerating this well. She has no complaints today. No breast concerns. She denies skin issues. No respiratory issues and no GERD or dysphagia. She had mammogram 1/2022 that was benign and showed treatment related changes. Did recommend MRI follow-up due to breast density. 1/14/22 Bill Fitzpatrick  is a 61 y.o. female who is here  today for follow up,. She continues on Arimidex per medical oncology. tolerating well. No new breast concerns or changes. No difficulty with ROM. No difficulty with swallowing. Recovered well from therapy. No lingering skin changes related to radiation  therapy. Recent mammogram negative except for treatment related changes. She continues on AI per medical oncology. UPDATED PAST MEDICAL HISTORY:  Since our prior encounter, Bill Fitzpatrick has not been hospitalized. There have been no significant changes to  the medical history.        MEDICATIONS:        Current Outpatient Medications:     anastrozole (ARIMIDEX) 1 MG tablet, TAKE 1 TABLET BY MOUTH DAILY, Disp: 30 tablet, Rfl: 5    Flaxseed, Linseed, (FLAXSEED OIL PO), Take by mouth, Disp: , Rfl:     calcium carbonate (OYSTER SHELL CALCIUM 500 MG) 1250 (500 Ca) MG tablet, Take by mouth daily, Disp: , Rfl:     ergocalciferol (ERGOCALCIFEROL) 1.25 MG (03220 UT) capsule, Take 50,000 Units by mouth, Disp: , Rfl:      ALLERGIES:     No Known Allergies          PHYSICAL EXAMINATION:    ECOG Performance status 0   VITAL SIGNS:    Visit Vitals    Vitals:    01/05/23 0930   BP: 122/84   Pulse: 63   Resp: 16   Temp: 98.4 °F (36.9 °C)   TempSrc: Oral   SpO2: 95%   Weight: 212 lb 1.6 oz (96.2 kg)       General: adult female in no acute distress; appears stated age  [de-identified]: normocephalic, atraumatic; EOMI;    Neck: supple with full ROM; Respiratory: normal inspiratory effort, no audible wheezes  Extremities: no cyanosis, clubbing, or edema  Musculoskeletal: mobility intact x4; normal ROM in all joints;   Skin: no skin lesions identified;  Neuro: AOx3; sensation intact  x 4; CNII-XII grossly intact  Psych: appropriate affect, insight, and judgement  Breast: L breast with surgical scar noted, no issues and healed. No palpable masses and no skin changes noted. No nipple retraction. Lymph nodes: no palpable supraclavicular or axillar adenopathy bilaterally. LABORATORY:     No visits with results within 3 Day(s) from this visit. Latest known visit with results is:   Office Visit on 04/13/2022   Component Date Value Ref Range Status    Diagnosis: 04/13/2022 Comment   Final    NEGATIVE FOR INTRAEPITHELIAL LESION OR MALIGNANCY. Specimen adequacy: 04/13/2022 Comment   Final    Comment: Satisfactory for evaluation. Endocervical and/or squamous metaplastic  cells (endocervical component) are present. Clinician Provided ICD10: 04/13/2022 Comment   Final    Z12.4    Performed by: 04/13/2022 Comment   Final    Anusha Radford Cytotechnologist (ASCP)    Danelle Oconnor [id number] 04/13/2022 .    Final    Note: 04/13/2022 Comment   Final    Comment: The Pap smear is a screening test designed to aid in the detection of  premalignant and malignant conditions of the uterine cervix. It is not a  diagnostic procedure and should not be used as the sole means of detecting  cervical cancer. Both false-positive and false-negative reports do occur. Methodology 04/13/2022 Comment   Final    Comment: This liquid based ThinPrep(R) pap test was screened with the  use of an image guided system. Sherry Bautista [id number] 04/13/2022 Comment   Final    Comment: The HPV DNA reflex criteria were not met with this specimen result  therefore, no HPV testing was performed. RADIOLOGY:   [facility] 3D BENY W MAMMO BI DX INCL CAD      Result Date: 1/4/2022   DIAGNOSTIC DIGITAL BILATERAL TOMOSYNTHESIS MAMMOGRAPHY CLINICAL INDICATION: Annual bilateral mammography due, follow-up of previous left lumpectomy for breast cancer in April 2021, currently 6 months after completing radiation. No new problems or symptoms  reported. Current antihormone therapy. Her mother and maternal grandmother had breast cancer. COMPARISON: 4/16/2021 and multiple others going back to 6/3/2013 FINDINGS: Mammogram: Digital diagnostic mammography was performed, and is interpreted in conjunction  with a computer assisted detection (CAD) system. Standard views and Tomosynthesis bilaterally demonstrate heterogeneously dense glandular tissues which can limit sensitivity for lesions. Additional left mediolateral view performed. Stable diffuse bilateral  typically benign calcifications with no suspicious developing cluster. Mild expected postoperative and radiation changes including scarring, distortion, edema, skin thickening on the left. No suspicious developing mass, nonsurgical distortion or nipple  retraction on either side. 1. Left expected lumpectomy and radiation change . 2. No evidence of residual or recurrent malignancy. Recommend screening mammography in one year. 3. Breast MRI would follow-up is due in March 2022 (as recommended on 3/15/2021 breast MRI).  This would  also be beneficial given dense complex postoperative breast parenchyma and elevated risk with the patient's personal and strong family history of breast cancer. Results today were reported to the patient at the time of interpretation. BI-RADS Assessment  Category 2: Benign finding BD3 We are including breast density information for the patient along with the mammogram report. IMPRESSION:  Elaina Harrington is a  61 y.o. female who has recovered well from therapy and is without any complaints today. PLAN:    - She is due for mammogram this month. Will order.  - Recommended post-RT MRI (and per radiology recommendations) at last visit and has not been scheduled. Encouraged to call to schedule and if scheduling issues or issues with orders, instructed to call our office.  - Continue AI per medical oncology. Has not seen since January 2022. Encouraged to call to have appt scheduled for continued monitoring on AI  - RTC in 6 months. At that point, may discharge to medical oncology if she wishes. Call with concerns or questions. Portions of this note were copied from prior encounters and reviewed for accuracy, currency, and represent documentation and tasks completed during this encounter. I verify and attest these portions to be unchanged from prior visits.            Ryan Garcia, APRN - [address] [facility]  [address] [place] [address]  Office : [phone]

## 2023-01-06 ENCOUNTER — HOSPITAL ENCOUNTER (OUTPATIENT)
Dept: MAMMOGRAPHY | Age: 68
Discharge: HOME | End: 2023-01-06
Payer: COMMERCIAL

## 2023-01-06 DIAGNOSIS — Z17.0 MALIGNANT NEOPLASM OF LEFT BREAST IN FEMALE, ESTROGEN RECEPTOR POSITIVE, UNSPECIFIED SITE OF BREAST (HCC): ICD-10-CM

## 2023-01-06 DIAGNOSIS — C50.912 MALIGNANT NEOPLASM OF LEFT BREAST IN FEMALE, ESTROGEN RECEPTOR POSITIVE, UNSPECIFIED SITE OF BREAST (HCC): ICD-10-CM

## 2023-01-06 PROCEDURE — 77067 SCR MAMMO BI INCL CAD: CPT

## 2023-01-11 ENCOUNTER — TELEPHONE (OUTPATIENT)
Dept: ONCOLOGY | Age: 68
End: 2023-01-11

## 2023-01-11 RX ORDER — ANASTROZOLE 1 MG/1
1 TABLET ORAL DAILY
Qty: 30 TABLET | Refills: 1 | Status: SHIPPED | OUTPATIENT
Start: 2023-01-11

## 2023-01-11 NOTE — TELEPHONE ENCOUNTER
FRONT OFFICE  PT requesting to schedule an appt. TRIAGE  PT need a refill on her prescription Anastrozole 1mg.     Pharmacy [facility] on  [address]

## 2023-02-10 ENCOUNTER — OFFICE VISIT (OUTPATIENT)
Dept: ONCOLOGY | Age: 68
End: 2023-02-10
Payer: COMMERCIAL

## 2023-02-10 ENCOUNTER — HOSPITAL ENCOUNTER (OUTPATIENT)
Dept: LAB | Age: 68
Discharge: HOME | End: 2023-02-10
Payer: COMMERCIAL

## 2023-02-10 VITALS
WEIGHT: 211.2 LBS | TEMPERATURE: 98 F | BODY MASS INDEX: 32.01 KG/M2 | OXYGEN SATURATION: 98 % | SYSTOLIC BLOOD PRESSURE: 108 MMHG | DIASTOLIC BLOOD PRESSURE: 64 MMHG | HEART RATE: 84 BPM | RESPIRATION RATE: 16 BRPM | HEIGHT: 68 IN

## 2023-02-10 DIAGNOSIS — C50.112 MALIGNANT NEOPLASM OF CENTRAL PORTION OF LEFT BREAST IN FEMALE, ESTROGEN RECEPTOR POSITIVE (HCC): Primary | ICD-10-CM

## 2023-02-10 DIAGNOSIS — Z17.0 MALIGNANT NEOPLASM OF CENTRAL PORTION OF LEFT BREAST IN FEMALE, ESTROGEN RECEPTOR POSITIVE (HCC): Primary | ICD-10-CM

## 2023-02-10 DIAGNOSIS — Z79.811 LONG TERM (CURRENT) USE OF AROMATASE INHIBITORS: ICD-10-CM

## 2023-02-10 DIAGNOSIS — Z13.820 SCREENING FOR OSTEOPOROSIS: ICD-10-CM

## 2023-02-10 DIAGNOSIS — Z17.0 MALIGNANT NEOPLASM OF LEFT BREAST IN FEMALE, ESTROGEN RECEPTOR POSITIVE, UNSPECIFIED SITE OF BREAST (HCC): ICD-10-CM

## 2023-02-10 DIAGNOSIS — C50.912 MALIGNANT NEOPLASM OF LEFT BREAST IN FEMALE, ESTROGEN RECEPTOR POSITIVE, UNSPECIFIED SITE OF BREAST (HCC): ICD-10-CM

## 2023-02-10 LAB
ALBUMIN SERPL-MCNC: 3.9 G/DL (ref 3.2–4.6)
ALBUMIN/GLOB SERPL: 1.1 (ref 0.4–1.6)
ALP SERPL-CCNC: 98 U/L (ref 50–136)
ALT SERPL-CCNC: 26 U/L (ref 12–65)
ANION GAP SERPL CALC-SCNC: 3 MMOL/L (ref 2–11)
AST SERPL-CCNC: 17 U/L (ref 15–37)
BASOPHILS # BLD: 0.1 K/UL (ref 0–0.2)
BASOPHILS NFR BLD: 1 % (ref 0–2)
BILIRUB SERPL-MCNC: 0.5 MG/DL (ref 0.2–1.1)
BUN SERPL-MCNC: 25 MG/DL (ref 8–23)
CALCIUM SERPL-MCNC: 9.5 MG/DL (ref 8.3–10.4)
CHLORIDE SERPL-SCNC: 111 MMOL/L (ref 101–110)
CO2 SERPL-SCNC: 28 MMOL/L (ref 21–32)
CREAT SERPL-MCNC: 0.9 MG/DL (ref 0.6–1)
DIFFERENTIAL METHOD BLD: NORMAL
EOSINOPHIL # BLD: 0.3 K/UL (ref 0–0.8)
EOSINOPHIL NFR BLD: 5 % (ref 0.5–7.8)
ERYTHROCYTE [DISTWIDTH] IN BLOOD BY AUTOMATED COUNT: 13.5 % (ref 11.9–14.6)
GLOBULIN SER CALC-MCNC: 3.6 G/DL (ref 2.8–4.5)
GLUCOSE SERPL-MCNC: 83 MG/DL (ref 65–100)
HCT VFR BLD AUTO: 42.8 % (ref 35.8–46.3)
HGB BLD-MCNC: 13.7 G/DL (ref 11.7–15.4)
IMM GRANULOCYTES # BLD AUTO: 0 K/UL (ref 0–0.5)
IMM GRANULOCYTES NFR BLD AUTO: 0 % (ref 0–5)
LYMPHOCYTES # BLD: 1.2 K/UL (ref 0.5–4.6)
LYMPHOCYTES NFR BLD: 22 % (ref 13–44)
MCH RBC QN AUTO: 28.7 PG (ref 26.1–32.9)
MCHC RBC AUTO-ENTMCNC: 32 G/DL (ref 31.4–35)
MCV RBC AUTO: 89.7 FL (ref 82–102)
MONOCYTES # BLD: 0.6 K/UL (ref 0.1–1.3)
MONOCYTES NFR BLD: 11 % (ref 4–12)
NEUTS SEG # BLD: 3.2 K/UL (ref 1.7–8.2)
NEUTS SEG NFR BLD: 61 % (ref 43–78)
NRBC # BLD: 0 K/UL (ref 0–0.2)
PLATELET # BLD AUTO: 204 K/UL (ref 150–450)
PMV BLD AUTO: 9.5 FL (ref 9.4–12.3)
POTASSIUM SERPL-SCNC: 4 MMOL/L (ref 3.5–5.1)
PROT SERPL-MCNC: 7.5 G/DL (ref 6.3–8.2)
RBC # BLD AUTO: 4.77 M/UL (ref 4.05–5.2)
SODIUM SERPL-SCNC: 142 MMOL/L (ref 133–143)
WBC # BLD AUTO: 5.3 K/UL (ref 4.3–11.1)

## 2023-02-10 PROCEDURE — 36415 COLL VENOUS BLD VENIPUNCTURE: CPT

## 2023-02-10 PROCEDURE — 1123F ACP DISCUSS/DSCN MKR DOCD: CPT | Performed by: NURSE PRACTITIONER

## 2023-02-10 PROCEDURE — 85025 COMPLETE CBC W/AUTO DIFF WBC: CPT

## 2023-02-10 PROCEDURE — 80053 COMPREHEN METABOLIC PANEL: CPT

## 2023-02-10 PROCEDURE — 99214 OFFICE O/P EST MOD 30 MIN: CPT | Performed by: NURSE PRACTITIONER

## 2023-02-10 RX ORDER — ANASTROZOLE 1 MG/1
1 TABLET ORAL DAILY
Qty: 30 TABLET | Refills: 5 | Status: SHIPPED | OUTPATIENT
Start: 2023-02-10

## 2023-02-10 ASSESSMENT — PATIENT HEALTH QUESTIONNAIRE - PHQ9
2. FEELING DOWN, DEPRESSED OR HOPELESS: 0
SUM OF ALL RESPONSES TO PHQ QUESTIONS 1-9: 0
1. LITTLE INTEREST OR PLEASURE IN DOING THINGS: 0
SUM OF ALL RESPONSES TO PHQ QUESTIONS 1-9: 0
SUM OF ALL RESPONSES TO PHQ9 QUESTIONS 1 & 2: 0

## 2023-02-10 NOTE — PROGRESS NOTES
[address] [place] [address] Hematology and Oncology: Office Visit Established Patient     Chief Complaint:       Chief Complaint   Patient presents with    Follow-up       History of Present Illness:  Ms. Rui Avitia  is a 61 y.o. female who presents  today for evaluation regarding breast cancer. She initially presented for a routine bilateral screening mammogram on 2/24/21 which identified an area of suspected architectural distortion in the slightly lower, medial anterior left breast. Further evaluation  with diagnostic left mammogram and left breast ultrasound performed on 3/3/21 showed the area of indeterminate mammographic distortion without sonographic correlate. On 3/15/21, she underwent MRI of the bilateral breasts which demonstrated a 1 cm multilobular  retroareolar mass suspicious for malignancy, otherwise negative although repeat MRI was recommended in 1 year due to extensive background enhancement. She underwent biopsy of the suspicious mass on 3/18/21, this showed invasive ductal carcinoma, low  grade, ER 93%, MT 82%, HER2 negative. She was referred to Dr. Linn Hemphill, who recommended left breast lumpectomy and sentinel lymph node biopsy. Final pathology revealed low grade invasive ductal carcinoma with lobular features, 8 mm in greatest dimension. The posterior margin was positive for invasive carcinoma so additional tissue was taken and margins were negative. The left sentinel node was negative for metastatic carcinoma. Because her tumor was under 1 cm, low grade, strongly ER/MT positive and  HER2 negative, the likelihood of benefit from adjuvant chemotherapy is virtually zero. Therefore, we will recommend antiestrogen therapy alone. She is post-menopausal, so a prescription was given for anastrozole. She should also meet with radiation  oncology, we will refer. She returns today for follow up regarding her ER+ breast cancer. She continues on Arimidex daily. She admits to good compliance and tolerance.  She denies any new issues or complaints. She has no hot flashes, swelling or vaginal dryness. She denies any arthralgias or myalgias with the exception of right knee pain which is chronic for her. She denies any breast symptoms. She does have occasional quick, pains in her left breast which sounds like nerve pain. She denies any shortness of breath. Her appetite has been great, eating/drinking well. She denies any recent  fevers, chills, or other infectious symptoms. Energy is acceptable. Review of Systems:   Constitutional: Negative. HENT: Negative. Eyes: Negative. Respiratory: Negative. Cardiovascular: Negative. Gastrointestinal: Negative. Genitourinary: Negative. Musculoskeletal: Positive chronic right knee pain. Skin: Negative. Neurological: Negative. Endo/Heme/Allergies: Negative. Psychiatric/Behavioral: Negative. All other systems reviewed and are negative.        No Known Allergies   Active Ambulatory Problems     Diagnosis Date Noted    Malignant neoplasm of left breast in female, estrogen receptor positive ([facility] [address] 03/28/2021    Facial laceration 08/29/2022    Scalp laceration 08/29/2022     Resolved Ambulatory Problems     Diagnosis Date Noted    No Resolved Ambulatory Problems     Past Medical History:   Diagnosis Date    Abnormal Pap smear     Cancer of left breast ([facility] [address]     Chicken pox     Cyst of breast     Measles     Menopause     Mumps     Radiation therapy complication      Past Surgical History:   Procedure Laterality Date    BREAST BIOPSY Left 4/16/2021    LEFT BREAST NEEDLE LOCALIZED LUMPECTOMY performed by Dominick Rollins MD at [facility] Left 03/18/2021    left retroareolar assymetry    CERVIX BIOPSY      around 2003    [facility] BREAST BIOPSY LEFT Left 3/18/2021    [facility] BREAST BIOPSY LEFT 3/18/2021 [facility]     Family History   Problem Relation Age of Onset    Hypertension Paternal Grandfather Osteoporosis Other     Diabetes Maternal Grandfather     Ovarian Cancer Neg Hx     Thyroid Disease Mother     Breast Cancer Mother 62    Heart Disease Paternal Grandfather     Colon Cancer Neg Hx      Social History     Socioeconomic History    Marital status:      Spouse name: Not on file    Number of children: Not on file    Years of education: Not on file    Highest education level: Not on file   Occupational History    Not on file   Tobacco Use    Smoking status: Never    Smokeless tobacco: Never   Substance and Sexual Activity    Alcohol use: Yes     Alcohol/week: 1.0 standard drink    Drug use: Never    Sexual activity: Not on file     Comment: vasectomy   Other Topics Concern    Not on file   Social History Narrative    Not on file     Social Determinants of Health     Financial Resource Strain: Not on file   Food Insecurity: Not on file   Transportation Needs: Not on file   Physical Activity: Not on file   Stress: Not on file   Social Connections: Not on file   Intimate Partner Violence: Not on file   Housing Stability: Not on file     Current Outpatient Medications on File Prior to Visit   Medication Sig Dispense Refill    Flaxseed, Linseed, (FLAXSEED OIL PO) Take by mouth      calcium carbonate (OYSTER SHELL CALCIUM 500 MG) 1250 (500 Ca) MG tablet Take by mouth daily      ergocalciferol (ERGOCALCIFEROL) 1.25 MG (36224 UT) capsule Take 50,000 Units by mouth       No current facility-administered medications on file prior to visit.              OBJECTIVE:  Visit Vitals     Vitals:    02/10/23 0900 02/10/23 0909   BP: (!) 118/54 108/64   Site: Left Upper Arm Left Upper Arm   Position: Sitting Standing   Cuff Size: Medium Adult Medium Adult   Pulse: 70 84   Resp: 16    Temp: 98 °F (36.7 °C)    TempSrc: Oral    SpO2: 98%    Weight: 211 lb 3.2 oz (95.8 kg)    Height: 5' 8\" (1.727 m)          Physical Exam:  Constitutional:  Well developed, well nourished female in no acute  distress, sitting comfortably in the exam room chair. HEENT:  Normocephalic and atraumatic. Oropharynx is clear, mucous membranes are moist.  Sclerae anicteric. Neck supple without JVD. No thyromegaly present. Lymph node     No palpable submandibular, cervical, supraclavicular, or axillary lymph nodes. Skin  Warm and dry. No bruising and no rash noted. No erythema. No pallor. Respiratory  Lungs are clear to auscultation bilaterally without wheezes, rales or rhonchi, normal air exchange without accessory muscle use. CVS  Normal rate, regular rhythm and normal S1 and S2. No murmurs, gallops, or rubs. Abdomen  Soft, nontender and nondistended, normoactive bowel sounds. No palpable mass. Neuro  Grossly nonfocal with no obvious sensory or motor deficits. MSK  Normal range of motion in general.  No edema and no tenderness. Psych  Appropriate mood and affect.                 Labs:     Hospital Outpatient Visit on 02/10/2023   Component Date Value Ref Range Status    WBC 02/10/2023 5.3  4.3 - 11.1 K/uL Final    RBC 02/10/2023 4.77  4.05 - 5.2 M/uL Final    Hemoglobin 02/10/2023 13.7  11.7 - 15.4 g/dL Final    Hematocrit 02/10/2023 42.8  35.8 - 46.3 % Final    MCV 02/10/2023 89.7  82.0 - 102.0 FL Final    MCH 02/10/2023 28.7  26.1 - 32.9 PG Final    MCHC 02/10/2023 32.0  31.4 - 35.0 g/dL Final    RDW 02/10/2023 13.5  11.9 - 14.6 % Final    Platelets 03/98/0580 204  150 - 450 K/uL Final    MPV 02/10/2023 9.5  9.4 - 12.3 FL Final    nRBC 02/10/2023 0.00  0.0 - 0.2 K/uL Final    **Note: Absolute NRBC parameter is now reported with Hemogram**    Seg Neutrophils 02/10/2023 61  43 - 78 % Final    Lymphocytes 02/10/2023 22  13 - 44 % Final    Monocytes 02/10/2023 11  4.0 - 12.0 % Final    Eosinophils % 02/10/2023 5  0.5 - 7.8 % Final    Basophils 02/10/2023 1  0.0 - 2.0 % Final    Immature Granulocytes 02/10/2023 0  0.0 - 5.0 % Final    Segs Absolute 02/10/2023 3.2  1.7 - 8.2 K/UL Final    Absolute Lymph # 02/10/2023 1.2  0.5 - 4.6 K/UL Final    Absolute Mono # 02/10/2023 0.6  0.1 - 1.3 K/UL Final    Absolute Eos # 02/10/2023 0.3  0.0 - 0.8 K/UL Final    Basophils Absolute 02/10/2023 0.1  0.0 - 0.2 K/UL Final    Absolute Immature Granulocyte 02/10/2023 0.0  0.0 - 0.5 K/UL Final    Differential Type 02/10/2023 AUTOMATED    Final    Sodium 02/10/2023 142  133 - 143 mmol/L Final    Potassium 02/10/2023 4.0  3.5 - 5.1 mmol/L Final    Chloride 02/10/2023 111 (A)  101 - 110 mmol/L Final    CO2 02/10/2023 28  21 - 32 mmol/L Final    Anion Gap 02/10/2023 3  2 - 11 mmol/L Final    Glucose 02/10/2023 83  65 - 100 mg/dL Final    BUN 02/10/2023 25 (A)  8 - 23 MG/DL Final    Creatinine 02/10/2023 0.90  0.6 - 1.0 MG/DL Final    Est, Glom Filt Rate 02/10/2023 >60  >60 ml/min/1.73m2 Final    Comment:      Pediatric calculator link: [email or web address] org/professionals/kdoqi/gfr_[email or web address]       These results are not intended for use in patients <25years of age. eGFR results are calculated without a race factor using  the 2021 CKD-EPI equation. Careful clinical correlation is recommended, particularly when comparing to results calculated using previous equations. The CKD-EPI equation is less accurate in patients with extremes of muscle mass, extra-renal metabolism of creatinine, excessive creatine ingestion, or following therapy that affects renal tubular secretion.       Calcium 02/10/2023 9.5  8.3 - 10.4 MG/DL Final    Total Bilirubin 02/10/2023 0.5  0.2 - 1.1 MG/DL Final    ALT 02/10/2023 26  12 - 65 U/L Final    AST 02/10/2023 17  15 - 37 U/L Final    Alk Phosphatase 02/10/2023 98  50 - 136 U/L Final    Total Protein 02/10/2023 7.5  6.3 - 8.2 g/dL Final    Albumin 02/10/2023 3.9  3.2 - 4.6 g/dL Final    Globulin 02/10/2023 3.6  2.8 - 4.5 g/dL Final    Albumin/Globulin Ratio 02/10/2023 1.1  0.4 - 1.6   Final              Imaging:   BREAST MRI BEFORE AND AFTER CONTRAST       CLINICAL HISTORY:  Follow-up indeterminate mammography and ultrasound in a   61-year-old with dense breast parenchyma and a strong family history of breast   cancer giving her a persistent lifetime risk of 27% by the Tyrer-Cuzick model. TECHNIQUE: Standard axial and sagittal images were obtained before and during   bolus injection of 18 cc of Dotarem IV. CAD was employed. COMPARISON:  Bilateral 3-D screening mammogram of February 24, 2021, left 3-D   additional views and ultrasound of March 3, 2021, and earlier studies. FINDINGS: There is extensive background parenchymal enhancement bilaterally,   which limits MRI detection of possible small malignant foci. There are multiple   small bilateral cysts suggestive of fibrocystic mastopathy. There is 1.0 cm   multilobular enhancing mass with suspicious kinetics corresponding to the focus   of left retroareolar architectural distortion approximately 3.5 cm posterior to   the nipple. No pathologically enlarged or dysmorphic lymph nodes are seen,   accounting for mammographic stability since 2015 of a 3 cm fatty-replaced left   axillary lymph node with lobular inferior cortical thickening. The liver,   lungs, and chest wall appear unremarkable as imaged. IMPRESSION       1. A 1 CM MULTILOBULAR RETROAREOLAR MASS WITH WORRISOME KINETICS ASSOCIATED   WITH MAMMOGRAPHIC ARCHITECTURAL DISTORTION IS SUSPICIOUS FOR MALIGNANCY, AND   STEREOTACTIC BIOPSY IS RECOMMENDED (IN THE ABSENCE OF A SONOGRAPHIC CORRELATE). 2.  NO MR EVIDENCE OF RIGHT BREAST MALIGNANCY OR OF METASTATIC DISEASE IN THE   IMAGED CHEST. 2.  EXTENSIVE BILATERAL BACKGROUND PARENCHYMAL ENHANCEMENT FOR  WHICH FOLLOW-UP   BREAST MRI IS RECOMMENDED IN ONE YEAR FOR THIS 63YEAR-OLD WITH DENSE, COMPLEX   BREAST PARENCHYMA AND A PERSISTENT 27% LIFETIME BREAST CANCER RISK (SEE ABOVE). LEFT: BI-RADS Assessment Category 4: Suspicious Finding- Biopsy should be   considered.    BILATERAL BACKGROUND PARENCHYMAL ENHANCEMENT: BI-RADS Assessment Category 3:   Probably benign- Short-interval follow-up suggested (one year). Pathology:                            ASSESSMENT:   Diagnosis Orders   1. Malignant neoplasm of central portion of left breast in female, estrogen receptor positive (HCC)  DEXA BONE DENSITY AXIAL SKELETON    anastrozole (ARIMIDEX) 1 MG tablet      2. Long term (current) use of aromatase inhibitors  DEXA BONE DENSITY AXIAL SKELETON      3. Screening for osteoporosis  DEXA BONE DENSITY AXIAL SKELETON              PLAN:   Lab studies were personally reviewed. Pertinent old records were reviewed. Breast cancer: left breast, low grade IDC, 8mm on lumpectomy, negative sentinel node, ER 93%, NE 82%, HER2 negative. Because her tumor was under 1 cm, low grade, strongly ER/NE positive and HER2 negative, the likelihood of benefit from adjuvant chemotherapy  is virtually zero. Therefore, we will recommend antiestrogen therapy alone. She is post-menopausal, so a prescription was given for anastrozole. She should also meet with radiation oncology, we will refer. She completed XRT in June 2021. She returns today for follow up regarding her ER+ breast cancer. She continues on Arimidex daily. She admits to good compliance and tolerance. She denies any new issues or complaints. She has no hot flashes, swelling or vaginal dryness. She denies any arthralgias or myalgias with the exception of right knee pain which is chronic for her. She denies any breast symptoms. She does have occasional quick, pains in her left breast which sounds like nerve pain. She denies any shortness of breath. Her appetite has been great, eating/drinking well. She denies any recent  fevers, chills, or other infectious symptoms. Energy is acceptable. Labs reviewed and unremarkable. Recently had mammogram 1/2023 - [facility]. She has MRI scheduled for July 2023. She is taking Calcium/Vitamin D.  DEXA due 5/2023, ordered.  She will continue daily Armidex - refill provided. All questions were asked and answered to the  best of my ability. F/u in 6 months to review tolerance to endocrine therapy. Goal will be 5 years of therapy due to the small tumor size and low risk of recurrence.               GONZALO Chowdary   [facility] Hematology and Oncology   [address] [place] [address]   Office : [phone]   Fax : [phone]

## 2023-06-15 ENCOUNTER — HOSPITAL ENCOUNTER (OUTPATIENT)
Dept: PHYSICAL THERAPY | Age: 68
Setting detail: RECURRING SERIES
Discharge: HOME | End: 2023-06-18
Payer: COMMERCIAL

## 2023-06-15 PROCEDURE — G0283 ELEC STIM OTHER THAN WOUND: HCPCS

## 2023-06-15 PROCEDURE — 97110 THERAPEUTIC EXERCISES: CPT

## 2023-06-19 ENCOUNTER — HOSPITAL ENCOUNTER (OUTPATIENT)
Dept: PHYSICAL THERAPY | Age: 68
Setting detail: RECURRING SERIES
Discharge: HOME | End: 2023-06-22
Payer: COMMERCIAL

## 2023-06-19 PROCEDURE — 97110 THERAPEUTIC EXERCISES: CPT

## 2023-06-19 PROCEDURE — G0283 ELEC STIM OTHER THAN WOUND: HCPCS

## 2023-06-19 NOTE — PROGRESS NOTES
Benji Fermin  : 1955  Primary: Carito Meneses ([facility])  Secondary:  [address] @ [id number] [place] [id number]  Phone: [phone]  Fax: [phone] Plan Frequency: 1-2 times a week    Plan of Care/Certification Expiration Date: 23      >PT Visit Info:  Plan Frequency: 1-2 times a week  Plan of Care/Certification Expiration Date: 23      Visit Count:  3    OUTPATIENT PHYSICAL THERAPY:OP NOTE TYPE: Treatment Note 2023       Episode  }Appt Desk             Treatment Diagnosis:  Pain in Right Knee (M25.561)  Stiffness of Right Knee, Not elsewhere classified (M25.661)  Difficulty in walking, Not elsewhere classified (R26.2)  Medical/Referring Diagnosis:  Primary osteoarthritis of right knee [M17.11]  Referring Physician:  Esther Guerrero MD MD Orders:  PT Eval and Treat   Date of Onset:  No data recorded   Allergies:   Patient has no known allergies. Restrictions/Precautions:  No data recordedNo data recorded     Interventions Planned (Treatment may consist of any combination of the following):    Current Treatment Recommendations: Strengthening; ROM; Functional mobility training     >Subjective Comments: \"I feel really good actually. Im doing better. \"      >Initial:     0 /10>Post Session:       0 /10  Medications Last Reviewed:  2023  Updated Objective Findings:  None Today  Treatment   TREATMENT:   THERAPEUTIC ACTIVITY: ( see below for minutes): Therapeutic activities per grid below to improve mobility, strength, balance and coordination. Required minimal visual, verbal, manual and tactile cues to improve independence and safety with daily activities . THERAPEUTIC EXERCISE: (see below for minutes): Exercises per grid below to improve mobility, strength, balance and coordination. Required minimal verbal and manual cues to promote proper body alignment, promote proper body posture and promote proper body mechanics.  Progressed resistance, range,

## 2023-06-21 ENCOUNTER — HOSPITAL ENCOUNTER (OUTPATIENT)
Dept: PHYSICAL THERAPY | Age: 68
Setting detail: RECURRING SERIES
Discharge: HOME | End: 2023-06-24
Payer: COMMERCIAL

## 2023-06-21 PROCEDURE — 97110 THERAPEUTIC EXERCISES: CPT

## 2023-06-21 PROCEDURE — G0283 ELEC STIM OTHER THAN WOUND: HCPCS

## 2023-06-21 NOTE — PROGRESS NOTES
Johnny Sarabjit  : 1955  [facility]  Secondary:  [address] @ [address] [place] [id number]  Phone: [phone]  Fax: [phone] Plan Frequency: 1-2 times a week    Plan of Care/Certification Expiration Date: 23      >PT Visit Info:  Plan Frequency: 1-2 times a week  Plan of Care/Certification Expiration Date: 23      Visit Count:  4    OUTPATIENT PHYSICAL THERAPY:OP NOTE TYPE: Treatment Note 2023       Episode  }Appt Desk             Treatment Diagnosis:  Pain in Right Knee (M25.561)  Stiffness of Right Knee, Not elsewhere classified (M25.661)  Difficulty in walking, Not elsewhere classified (R26.2)  Medical/Referring Diagnosis:  Primary osteoarthritis of right knee [M17.11]  Referring Physician:  Yusuf Miranda MD MD Orders:  PT Eval and Treat   Date of Onset:  No data recorded   Allergies:   Patient has no known allergies. Restrictions/Precautions:  No data recordedNo data recorded     Interventions Planned (Treatment may consist of any combination of the following):    Current Treatment Recommendations: Strengthening; ROM; Functional mobility training     >Subjective Comments: \"I feel really good actually. Im doing better. My L knee is actually bothering me a little bit but its from the exercises. \"      >Initial:     0 /10>Post Session:       0 10  Medications Last Reviewed:  2023  Updated Objective Findings:  None Today  Treatment   TREATMENT:   THERAPEUTIC ACTIVITY: ( see below for minutes): Therapeutic activities per grid below to improve mobility, strength, balance and coordination. Required minimal visual, verbal, manual and tactile cues to improve independence and safety with daily activities . THERAPEUTIC EXERCISE: (see below for minutes): Exercises per grid below to improve mobility, strength, balance and coordination.  Required minimal verbal and manual cues to promote proper body alignment, promote proper

## 2023-06-29 ENCOUNTER — HOSPITAL ENCOUNTER (OUTPATIENT)
Dept: PHYSICAL THERAPY | Age: 68
Setting detail: RECURRING SERIES
End: 2023-06-29
Payer: COMMERCIAL

## 2023-06-29 PROCEDURE — 97110 THERAPEUTIC EXERCISES: CPT

## 2023-07-03 ENCOUNTER — OFFICE VISIT (OUTPATIENT)
Dept: ORTHOPEDIC SURGERY | Age: 68
End: 2023-07-03
Payer: MEDICARE

## 2023-07-03 DIAGNOSIS — M25.561 ACUTE PAIN OF BOTH KNEES: ICD-10-CM

## 2023-07-03 DIAGNOSIS — M25.562 ACUTE PAIN OF BOTH KNEES: ICD-10-CM

## 2023-07-03 DIAGNOSIS — M17.0 BILATERAL PRIMARY OSTEOARTHRITIS OF KNEE: Primary | ICD-10-CM

## 2023-07-03 PROCEDURE — G8428 CUR MEDS NOT DOCUMENT: HCPCS | Performed by: ORTHOPAEDIC SURGERY

## 2023-07-03 PROCEDURE — G8417 CALC BMI ABV UP PARAM F/U: HCPCS | Performed by: ORTHOPAEDIC SURGERY

## 2023-07-03 PROCEDURE — 3017F COLORECTAL CA SCREEN DOC REV: CPT | Performed by: ORTHOPAEDIC SURGERY

## 2023-07-03 PROCEDURE — 1090F PRES/ABSN URINE INCON ASSESS: CPT | Performed by: ORTHOPAEDIC SURGERY

## 2023-07-03 PROCEDURE — 1123F ACP DISCUSS/DSCN MKR DOCD: CPT | Performed by: ORTHOPAEDIC SURGERY

## 2023-07-03 PROCEDURE — 99214 OFFICE O/P EST MOD 30 MIN: CPT | Performed by: ORTHOPAEDIC SURGERY

## 2023-07-03 PROCEDURE — G8399 PT W/DXA RESULTS DOCUMENT: HCPCS | Performed by: ORTHOPAEDIC SURGERY

## 2023-07-03 PROCEDURE — 1036F TOBACCO NON-USER: CPT | Performed by: ORTHOPAEDIC SURGERY

## 2023-07-03 PROCEDURE — 20611 DRAIN/INJ JOINT/BURSA W/US: CPT | Performed by: ORTHOPAEDIC SURGERY

## 2023-07-03 RX ORDER — HYALURONATE SODIUM 10 MG/ML
20 SYRINGE (ML) INTRAARTICULAR ONCE
Status: COMPLETED | OUTPATIENT
Start: 2023-07-03 | End: 2023-07-03

## 2023-07-03 RX ADMIN — Medication 20 MG: at 09:25

## 2023-07-03 NOTE — PROGRESS NOTES
Name: You Grewal  YOB: 1955  Gender: female  MRN: [id number]    CC:   Chief Complaint   Patient presents with    Knee Pain     Bilateral knee pain          DIAGNOSIS:   Encounter Diagnosis   Name Primary? Acute pain of both knees Yes        HPI:   The pain has been present for months and is becoming worse. It hurts at night when sleeping. The pain is located over the knee. It does hurt to walk and gets worse with increased distances. The pain does not radiate down the leg. Numbness and tingling are not noted. Treatment so far has been duction therapies in the past.  She is responded well with HP. Current Outpatient Medications:     anastrozole (ARIMIDEX) 1 MG tablet, Take 1 tablet by mouth daily, Disp: 30 tablet, Rfl: 5    Flaxseed, Linseed, (FLAXSEED OIL PO), Take by mouth, Disp: , Rfl:     calcium carbonate (OYSTER SHELL CALCIUM 500 MG) 1250 (500 Ca) MG tablet, Take by mouth daily, Disp: , Rfl:     ergocalciferol (ERGOCALCIFEROL) 1.25 MG (36270 UT) capsule, Take 50,000 Units by mouth, Disp: , Rfl:   No Known Allergies  Past Medical History:   Diagnosis Date    Abnormal Pap smear     Cancer of left breast (HCC)     Chicken pox     Cyst of breast     Left and right breast cysts. Measles     Menopause     Mumps     Radiation therapy complication      . pmh  Past Surgical History:   Procedure Laterality Date    BREAST BIOPSY Left 4/16/2021    LEFT BREAST NEEDLE LOCALIZED LUMPECTOMY performed by Sridevi Glez MD at [address] Left 03/18/2021    left retroareolar assymetry    CERVIX BIOPSY      around 2003    [facility] BREAST BIOPSY LEFT Left 3/18/2021    [facility] LOC BREAST BIOPSY LEFT 3/18/2021 [facility]     Family History   Problem Relation Age of Onset    Hypertension Paternal Grandfather     Osteoporosis Other     Diabetes Maternal Grandfather     Ovarian Cancer Neg Hx     Thyroid Disease Mother     Breast Cancer Mother 62

## 2023-07-05 ENCOUNTER — TELEPHONE (OUTPATIENT)
Dept: RADIATION ONCOLOGY | Age: 68
End: 2023-07-05

## 2023-07-07 ENCOUNTER — HOSPITAL ENCOUNTER (OUTPATIENT)
Dept: PHYSICAL THERAPY | Age: 68
Setting detail: RECURRING SERIES
Discharge: HOME | End: 2023-07-10
Payer: MEDICARE

## 2023-07-07 PROCEDURE — 97110 THERAPEUTIC EXERCISES: CPT

## 2023-07-10 ENCOUNTER — HOSPITAL ENCOUNTER (OUTPATIENT)
Dept: MRI IMAGING | Age: 68
Discharge: HOME | End: 2023-07-13
Payer: MEDICARE

## 2023-07-10 ENCOUNTER — OFFICE VISIT (OUTPATIENT)
Dept: ORTHOPEDIC SURGERY | Age: 68
End: 2023-07-10
Payer: MEDICARE

## 2023-07-10 ENCOUNTER — HOSPITAL ENCOUNTER (OUTPATIENT)
Dept: MAMMOGRAPHY | Age: 68
Discharge: HOME | End: 2023-07-13
Payer: MEDICARE

## 2023-07-10 DIAGNOSIS — Z17.0 MALIGNANT NEOPLASM OF CENTRAL PORTION OF LEFT BREAST IN FEMALE, ESTROGEN RECEPTOR POSITIVE (HCC): ICD-10-CM

## 2023-07-10 DIAGNOSIS — C50.112 MALIGNANT NEOPLASM OF CENTRAL PORTION OF LEFT BREAST (HCC): ICD-10-CM

## 2023-07-10 DIAGNOSIS — C50.112 MALIGNANT NEOPLASM OF CENTRAL PORTION OF LEFT BREAST IN FEMALE, ESTROGEN RECEPTOR POSITIVE (HCC): ICD-10-CM

## 2023-07-10 DIAGNOSIS — Z79.811 LONG TERM (CURRENT) USE OF AROMATASE INHIBITORS: ICD-10-CM

## 2023-07-10 DIAGNOSIS — M17.0 BILATERAL PRIMARY OSTEOARTHRITIS OF KNEE: Primary | ICD-10-CM

## 2023-07-10 DIAGNOSIS — Z13.820 SCREENING FOR OSTEOPOROSIS: ICD-10-CM

## 2023-07-10 PROCEDURE — 6360000004 HC RX CONTRAST MEDICATION: Performed by: NURSE PRACTITIONER

## 2023-07-10 PROCEDURE — A9579 GAD-BASE MR CONTRAST NOS,1ML: HCPCS | Performed by: NURSE PRACTITIONER

## 2023-07-10 PROCEDURE — 20611 DRAIN/INJ JOINT/BURSA W/US: CPT | Performed by: ORTHOPAEDIC SURGERY

## 2023-07-10 PROCEDURE — 2580000003 HC RX 258: Performed by: NURSE PRACTITIONER

## 2023-07-10 PROCEDURE — 77080 DXA BONE DENSITY AXIAL: CPT

## 2023-07-10 PROCEDURE — C8908 MRI W/O FOL W/CONT, BREAST,: HCPCS

## 2023-07-10 RX ORDER — HYALURONATE SODIUM 10 MG/ML
20 SYRINGE (ML) INTRAARTICULAR ONCE
Status: COMPLETED | OUTPATIENT
Start: 2023-07-10 | End: 2023-07-10

## 2023-07-10 RX ORDER — SODIUM CHLORIDE 0.9 % (FLUSH) 0.9 %
30 SYRINGE (ML) INJECTION AS NEEDED
Status: DISCONTINUED | OUTPATIENT
Start: 2023-07-10 | End: 2023-07-14 | Stop reason: HOSPADM

## 2023-07-10 RX ADMIN — Medication 20 MG: at 13:05

## 2023-07-10 RX ADMIN — GADOTERIDOL 19 ML: 279.3 INJECTION, SOLUTION INTRAVENOUS at 09:08

## 2023-07-10 RX ADMIN — SODIUM CHLORIDE, PRESERVATIVE FREE 30 ML: 5 INJECTION INTRAVENOUS at 09:08

## 2023-07-10 NOTE — PROGRESS NOTES
Name: [facility]  YOB: 1955  Gender: female  MRN: [id number]        CC: Bilateral Knee Pain     PROCEDURE:  2  of 3 bilateral knee HP    Diagnosis:   Encounter Diagnosis   Name Primary? Bilateral primary osteoarthritis of knee Yes          Mindray US unit with variable frequency (6.0-15.0 MHz) linear transducer was used to visualize the intracondylar notch, retropatellar fat pad, patella tendon, patella, tibia, and to ensure proper intra-articular needle placement. Injection image was saved in patient's permanent chart. Procedure Note: The patient was placed in upright position with both knees hanging freely from exam table. The knees were prepped in sterile fashion using alcohol wipe. Using the TweetUp ultrasound guidance, a 22 gauge needle was then introduced into both knee joints from an infrapatellar approach and 2 mL of Euflexxa was injected freely into each knee. The needle was then removed, pressure hemostasis achieved, injection sites were cleansed with alcohol wipe, and dressed with band aid. The patient tolerated the procedure without complication. The patient will follow up as scheduled.

## 2023-07-11 ENCOUNTER — HOSPITAL ENCOUNTER (OUTPATIENT)
Dept: PHYSICAL THERAPY | Age: 68
Setting detail: RECURRING SERIES
Discharge: HOME | End: 2023-07-14
Payer: MEDICARE

## 2023-07-11 PROCEDURE — 97110 THERAPEUTIC EXERCISES: CPT

## 2023-07-11 PROCEDURE — G0283 ELEC STIM OTHER THAN WOUND: HCPCS

## 2023-07-11 NOTE — PROGRESS NOTES
8# ball 3x10    Step up and step down    6in step 2x10 bilateral    Repeat  10in step x 20 bilateral with more difficulty on L    Functional Activities:                                                      Treatment/Session Summary:    >Treatment Assessment: Pt is still showing progress and is not having any difficulty with the R knee. The L knee is still bothering her slightly but she is improving with her gait pattern. Continue working on strengthening.        Communication/Consultation:  None today  Equipment provided today:  None  Recommendations/Intent for next treatment session: Next visit will focus on stretching, strengthening and HEP.    >Total Treatment Billable Duration:  45 minutes  Time In: 1100  Time Out: 821 Dely, PT       Charge Capture  }Post Session Pain  PT Visit Info  [address]   [facility]    Future Appointments   Date Time Provider [address]   7/14/2023 10:15 AM Tammy Vela, PT [facility]   7/17/2023  9:10 AM Magy Lawton MD [facility]   7/18/2023 11:00 AM Tammy Vela, PT [facility]   7/21/2023 10:15 AM [facility]   7/25/2023 10:15 AM Tammy Vela, PT [facility]   7/28/2023 10:15 AM Tammy Vela, PT [facility]   8/11/2023  9:00 AM [facility]   8/11/2023  9:30 AM Junior Nielsen MD [facility]

## 2023-07-14 ENCOUNTER — HOSPITAL ENCOUNTER (OUTPATIENT)
Dept: PHYSICAL THERAPY | Age: 68
Setting detail: RECURRING SERIES
Discharge: HOME | End: 2023-07-17
Payer: MEDICARE

## 2023-07-14 PROCEDURE — G0283 ELEC STIM OTHER THAN WOUND: HCPCS

## 2023-07-14 PROCEDURE — 97110 THERAPEUTIC EXERCISES: CPT

## 2023-07-17 ENCOUNTER — OFFICE VISIT (OUTPATIENT)
Dept: ORTHOPEDIC SURGERY | Age: 68
End: 2023-07-17
Payer: MEDICARE

## 2023-07-17 DIAGNOSIS — M17.0 BILATERAL PRIMARY OSTEOARTHRITIS OF KNEE: Primary | ICD-10-CM

## 2023-07-17 PROCEDURE — 20611 DRAIN/INJ JOINT/BURSA W/US: CPT | Performed by: ORTHOPAEDIC SURGERY

## 2023-07-17 RX ORDER — HYALURONATE SODIUM 10 MG/ML
20 SYRINGE (ML) INTRAARTICULAR ONCE
Status: COMPLETED | OUTPATIENT
Start: 2023-07-17 | End: 2023-07-17

## 2023-07-17 RX ADMIN — Medication 20 MG: at 09:06

## 2023-07-17 RX ADMIN — Medication 20 MG: at 09:05

## 2023-07-18 ENCOUNTER — APPOINTMENT (RX ONLY)
Dept: URBAN - METROPOLITAN AREA CLINIC 329 | Facility: CLINIC | Age: 68
Setting detail: DERMATOLOGY
End: 2023-07-18

## 2023-07-18 ENCOUNTER — HOSPITAL ENCOUNTER (OUTPATIENT)
Dept: PHYSICAL THERAPY | Age: 68
Setting detail: RECURRING SERIES
End: 2023-07-18
Payer: MEDICARE

## 2023-07-18 DIAGNOSIS — L81.4 OTHER MELANIN HYPERPIGMENTATION: ICD-10-CM | Status: STABLE

## 2023-07-18 DIAGNOSIS — D22 MELANOCYTIC NEVI: ICD-10-CM | Status: UNCHANGED

## 2023-07-18 DIAGNOSIS — L68.0 HIRSUTISM: ICD-10-CM | Status: INADEQUATELY CONTROLLED

## 2023-07-18 DIAGNOSIS — L57.0 ACTINIC KERATOSIS: ICD-10-CM

## 2023-07-18 DIAGNOSIS — D18.0 HEMANGIOMA: ICD-10-CM | Status: UNCHANGED

## 2023-07-18 DIAGNOSIS — L82.1 OTHER SEBORRHEIC KERATOSIS: ICD-10-CM | Status: UNCHANGED

## 2023-07-18 PROBLEM — D22.5 MELANOCYTIC NEVI OF TRUNK: Status: ACTIVE | Noted: 2023-07-18

## 2023-07-18 PROBLEM — D18.01 HEMANGIOMA OF SKIN AND SUBCUTANEOUS TISSUE: Status: ACTIVE | Noted: 2023-07-18

## 2023-07-18 PROCEDURE — ? COUNSELING

## 2023-07-18 PROCEDURE — ? LIQUID NITROGEN

## 2023-07-18 PROCEDURE — ? PRESCRIPTION

## 2023-07-18 PROCEDURE — ? ADDITIONAL NOTES

## 2023-07-18 PROCEDURE — ? PRESCRIPTION MEDICATION MANAGEMENT

## 2023-07-18 PROCEDURE — ? SUNSCREEN RECOMMENDATIONS

## 2023-07-18 PROCEDURE — 99213 OFFICE O/P EST LOW 20 MIN: CPT | Mod: 25

## 2023-07-18 PROCEDURE — 17003 DESTRUCT PREMALG LES 2-14: CPT

## 2023-07-18 PROCEDURE — 17000 DESTRUCT PREMALG LESION: CPT

## 2023-07-18 RX ORDER — EFLORNITHINE HYDROCHLORIDE 139 MG/G
CREAM TOPICAL BID
Qty: 45 | Refills: 3 | Status: ERX | COMMUNITY
Start: 2023-07-18

## 2023-07-18 RX ADMIN — EFLORNITHINE HYDROCHLORIDE: 139 CREAM TOPICAL at 00:00

## 2023-07-18 ASSESSMENT — LOCATION DETAILED DESCRIPTION DERM
LOCATION DETAILED: RIGHT INFERIOR UPPER BACK
LOCATION DETAILED: INFERIOR THORACIC SPINE
LOCATION DETAILED: PHILTRUM
LOCATION DETAILED: RIGHT SUPERIOR MEDIAL UPPER BACK
LOCATION DETAILED: LEFT MEDIAL SUPERIOR CHEST
LOCATION DETAILED: RIGHT CHIN
LOCATION DETAILED: RIGHT UPPER CUTANEOUS LIP
LOCATION DETAILED: LEFT SUPERIOR MEDIAL MIDBACK
LOCATION DETAILED: PERIUMBILICAL SKIN

## 2023-07-18 ASSESSMENT — LOCATION SIMPLE DESCRIPTION DERM
LOCATION SIMPLE: UPPER BACK
LOCATION SIMPLE: UPPER LIP
LOCATION SIMPLE: ABDOMEN
LOCATION SIMPLE: LEFT LOWER BACK
LOCATION SIMPLE: CHEST
LOCATION SIMPLE: CHIN
LOCATION SIMPLE: RIGHT LIP
LOCATION SIMPLE: RIGHT UPPER BACK

## 2023-07-18 ASSESSMENT — LOCATION ZONE DERM
LOCATION ZONE: LIP
LOCATION ZONE: FACE
LOCATION ZONE: TRUNK

## 2023-07-18 NOTE — PROCEDURE: ADDITIONAL NOTES
Detail Level: Simple
Additional Notes: Will recheck area in 4 months
Render Risk Assessment In Note?: no

## 2023-07-21 ENCOUNTER — HOSPITAL ENCOUNTER (OUTPATIENT)
Dept: PHYSICAL THERAPY | Age: 68
Setting detail: RECURRING SERIES
Discharge: HOME | End: 2023-07-24
Payer: MEDICARE

## 2023-07-21 PROCEDURE — 97110 THERAPEUTIC EXERCISES: CPT

## 2023-07-21 PROCEDURE — G0283 ELEC STIM OTHER THAN WOUND: HCPCS

## 2023-07-21 NOTE — PROGRESS NOTES
Noe Chinchilla  : 1955  Primary: Medicare Part A And B ([employer])  Secondary:  [address] [address]  Phone: [phone]  Fax: [phone] Plan Frequency: 1-2 times a week    Plan of Care/Certification Expiration Date: 23      >PT Visit Info:  Plan Frequency: 1-2 times a week  Plan of Care/Certification Expiration Date: 23      Visit Count:  9    OUTPATIENT PHYSICAL THERAPY:OP NOTE TYPE: Treatment Note 2023       Episode  }Appt Desk             Treatment Diagnosis:  Pain in Right Knee (M25.561)  Stiffness of Right Knee, Not elsewhere classified (M25.661)  Difficulty in walking, Not elsewhere classified (R26.2)  Medical/Referring Diagnosis:  Primary osteoarthritis of right knee [M17.11]  Referring Physician:  Radha Nuñez MD MD Orders:  PT Eval and Treat   Date of Onset:  No data recorded   Allergies:   Patient has no known allergies. Restrictions/Precautions:  No data recordedNo data recorded     Interventions Planned (Treatment may consist of any combination of the following):    Current Treatment Recommendations: Strengthening; ROM; Functional mobility training     >Subjective Comments: \"Im feeling pretty good. \"      >Initial:    mild pain >Post Session:     no increase  Medications Last Reviewed:  2023  Updated Objective Findings:  None Today  Treatment   TREATMENT:   THERAPEUTIC ACTIVITY: ( see below for minutes): Therapeutic activities per grid below to improve mobility, strength, balance and coordination. Required minimal visual, verbal, manual and tactile cues to improve independence and safety with daily activities . THERAPEUTIC EXERCISE: (see below for minutes): Exercises per grid below to improve mobility, strength, balance and coordination. Required minimal verbal and manual cues to promote proper body alignment, promote proper body posture and promote proper body mechanics.  Progressed resistance, range,

## 2023-07-25 ENCOUNTER — APPOINTMENT (OUTPATIENT)
Dept: PHYSICAL THERAPY | Age: 68
End: 2023-07-25
Payer: MEDICARE

## 2023-07-25 ENCOUNTER — HOSPITAL ENCOUNTER (OUTPATIENT)
Dept: PHYSICAL THERAPY | Age: 68
Setting detail: RECURRING SERIES
Discharge: HOME | End: 2023-07-28
Payer: MEDICARE

## 2023-07-25 PROCEDURE — 97110 THERAPEUTIC EXERCISES: CPT

## 2023-07-25 PROCEDURE — G0283 ELEC STIM OTHER THAN WOUND: HCPCS

## 2023-07-25 NOTE — PROGRESS NOTES
Whitney Rubio  : 1955  Primary: Medicare Part [place] And B ([place] [employer])  Secondary:  [address] [facility] [address]  Phone: [phone]  Fax: [phone] Plan Frequency: 1-2 times a week    Plan of Care/Certification Expiration Date: 23      >PT Visit Info:  Plan Frequency: 1-2 times a week  Plan of Care/Certification Expiration Date: 23      Visit Count:  10    OUTPATIENT PHYSICAL THERAPY:OP NOTE TYPE: Treatment Note 2023       Episode  }Appt Desk             Treatment Diagnosis:  Pain in Right Knee (M25.561)  Stiffness of Right Knee, Not elsewhere classified (M25.661)  Difficulty in walking, Not elsewhere classified (R26.2)  Medical/Referring Diagnosis:  Primary osteoarthritis of right knee [M17.11]  Referring Physician:  Jan Garza MD MD Orders:  PT Eval and Treat   Date of Onset:  No data recorded   Allergies:   Patient has no known allergies. Restrictions/Precautions:  No data recordedNo data recorded     Interventions Planned (Treatment may consist of any combination of the following):    Current Treatment Recommendations: Strengthening; ROM; Functional mobility training     >Subjective Comments: \"Im doing really good with pain but the knee just feels like its not stable. \"      >Initial:    mild pain >Post Session:     no increase  Medications Last Reviewed:  2023  Updated Objective Findings:  None Today  Treatment   TREATMENT:   THERAPEUTIC ACTIVITY: ( see below for minutes): Therapeutic activities per grid below to improve mobility, strength, balance and coordination. Required minimal visual, verbal, manual and tactile cues to improve independence and safety with daily activities . THERAPEUTIC EXERCISE: (see below for minutes): Exercises per grid below to improve mobility, strength, balance and coordination.  Required minimal verbal and manual cues to promote proper body alignment, promote proper body posture and promote

## 2023-07-28 ENCOUNTER — HOSPITAL ENCOUNTER (OUTPATIENT)
Dept: PHYSICAL THERAPY | Age: 68
Setting detail: RECURRING SERIES
Discharge: HOME | End: 2023-07-31
Payer: MEDICARE

## 2023-07-28 PROCEDURE — 97016 VASOPNEUMATIC DEVICE THERAPY: CPT

## 2023-07-28 PROCEDURE — 97110 THERAPEUTIC EXERCISES: CPT

## 2023-07-28 NOTE — PROGRESS NOTES
Janie Matthews  : 1955  Primary: Medicare Part A And B ([employer])  Secondary:  [address] [facility] [address]  Phone: [phone]  Fax: [phone] Plan Frequency: 1-2 times a week    Plan of Care/Certification Expiration Date: 23      >PT Visit Info:  Plan Frequency: 1-2 times a week  Plan of Care/Certification Expiration Date: 23      Visit Count:  11    OUTPATIENT PHYSICAL THERAPY:OP NOTE TYPE: Treatment Note 2023       Episode  }Appt Desk             Treatment Diagnosis:  Pain in Right Knee (M25.561)  Stiffness of Right Knee, Not elsewhere classified (M25.661)  Difficulty in walking, Not elsewhere classified (R26.2)  Medical/Referring Diagnosis:  Primary osteoarthritis of right knee [M17.11]  Referring Physician:  Nida Bob MD MD Orders:  PT Eval and Treat   Date of Onset:  No data recorded   Allergies:   Patient has no known allergies. Restrictions/Precautions:  No data recordedNo data recorded     Interventions Planned (Treatment may consist of any combination of the following):    Current Treatment Recommendations: Strengthening; ROM; Functional mobility training     >Subjective Comments: \"Im just really stiff today. We need to do stretching. \"      >Initial:    mild pain >Post Session:     no increase  Medications Last Reviewed:  2023  Updated Objective Findings:  None Today  Treatment   TREATMENT:   THERAPEUTIC ACTIVITY: ( see below for minutes): Therapeutic activities per grid below to improve mobility, strength, balance and coordination. Required minimal visual, verbal, manual and tactile cues to improve independence and safety with daily activities . THERAPEUTIC EXERCISE: (see below for minutes): Exercises per grid below to improve mobility, strength, balance and coordination. Required minimal verbal and manual cues to promote proper body alignment, promote proper body posture and promote proper body mechanics.

## 2023-08-01 ENCOUNTER — HOSPITAL ENCOUNTER (OUTPATIENT)
Dept: PHYSICAL THERAPY | Age: 68
Setting detail: RECURRING SERIES
Discharge: HOME | End: 2023-08-04
Payer: MEDICARE

## 2023-08-01 PROCEDURE — 97110 THERAPEUTIC EXERCISES: CPT

## 2023-08-01 NOTE — PROGRESS NOTES
sec hold x 6 B with UE support   Side lying clamshells     3x10 B X20 bilateral   X20 bilateral   X20 bilateral     Quad set  5SH x 10  Repeat  SAQ 6# x30 bilateral  LAQ 5# x20 bilateral    LAQ 9# x20 bilateral  Repeat 3x10  Repeat x30 bilateral      Modified planks on table     30 sec hold x 3 on forearms on mat table    Reverse lunge with slider 2x10 bilateral      HS curls with SB   2x10     Repeat   3x10  Repeat      HS stretch with strap   2x15SH Repeat  Repeat  30 sec hold x 4 B Repeat  Repeat  Repeat  Repeat  Repeat  30 sec hold x 4 B   Heel raises   3x10  Repeat  Repeat   X30  Repeat  Repeat  Repeat  Repeat  X30 on incline   Slantboard   x20SH  Repeat  Repeat   x30SH  Repeat  Repeat  Repeat  Repeat  1 min hold on level 2   Leg extension machine           3x10, 25# B, 90-20 degrees   Sit to stand    8# 2x10  Repeat   Raised table 2x10  With 8# ball 3x10  Repeat  Repeat   Leg press 3x10, 60# B   Hamstring curl machine           3x10, 37.5# B   Step up and step down    6in step 2x10 bilateral    Repeat  10in step x 20 bilateral with more difficulty on L   Repeat      Functional Activities:                                                                          Treatment/Session Summary:    >Treatment Assessment: Pt reports pain with weight bearing activities but she performed the machine exercises without increased pain.      Communication/Consultation:  None today  Equipment provided today:  None  Recommendations/Intent for next treatment session: Next visit will focus on stretching, strengthening and HEP.    >Total Treatment Billable Duration:  43 minutes  Time In: 1400  Time Out: [address], PTA       Charge Capture  }Post Session Pain  PT Visit 25 [facility]  MD 9 [facility]    Future Appointments   Date Time Provider [address] [place] [address]   8/3/2023 11:45 AM Jm Herbert, PTA [facility]   8/8/2023  3:30 PM Carin Hobson, PT [facility]   8/10/2023 11:45

## 2023-08-03 ENCOUNTER — HOSPITAL ENCOUNTER (OUTPATIENT)
Dept: PHYSICAL THERAPY | Age: 68
Setting detail: RECURRING SERIES
Discharge: HOME | End: 2023-08-06
Payer: MEDICARE

## 2023-08-03 DIAGNOSIS — Z17.0 MALIGNANT NEOPLASM OF CENTRAL PORTION OF LEFT BREAST IN FEMALE, ESTROGEN RECEPTOR POSITIVE (HCC): ICD-10-CM

## 2023-08-03 DIAGNOSIS — C50.112 MALIGNANT NEOPLASM OF CENTRAL PORTION OF LEFT BREAST IN FEMALE, ESTROGEN RECEPTOR POSITIVE (HCC): ICD-10-CM

## 2023-08-03 PROCEDURE — 97110 THERAPEUTIC EXERCISES: CPT

## 2023-08-03 NOTE — PROGRESS NOTES
[facility]  : 1955  Primary: Medicare Part A And B ([employer])  Secondary:  [address] [facility] [address]  Phone: [phone]  Fax: [phone] Plan Frequency: 1-2 times a week    Plan of Care/Certification Expiration Date: 23      >PT Visit Info:  Plan Frequency: 1-2 times a week  Plan of Care/Certification Expiration Date: 23      Visit Count:  13    OUTPATIENT PHYSICAL THERAPY:OP NOTE TYPE: Treatment Note 8/3/2023       Episode  }Appt Desk             Treatment Diagnosis:  Pain in Right Knee (M25.561)  Stiffness of Right Knee, Not elsewhere classified (M25.661)  Difficulty in walking, Not elsewhere classified (R26.2)  Medical/Referring Diagnosis:  Primary osteoarthritis of right knee [M17.11]  Referring Physician:  Caryl Quevedo MD MD Orders:  PT Eval and Treat   Date of Onset:  No data recorded   Allergies:   Patient has no known allergies. Restrictions/Precautions:  No data recordedNo data recorded     Interventions Planned (Treatment may consist of any combination of the following):    Current Treatment Recommendations: Strengthening; ROM; Functional mobility training     >Subjective Comments: notes feeling tight today, but feels like she has gotten much stronger. >Initial:    0/10 R knee >Post Session:     no increase  Medications Last Reviewed:  8/3/2023  Updated Objective Findings:  None Today  Treatment   TREATMENT:   THERAPEUTIC ACTIVITY: ( see below for minutes): Therapeutic activities per grid below to improve mobility, strength, balance and coordination. Required minimal visual, verbal, manual and tactile cues to improve independence and safety with daily activities . THERAPEUTIC EXERCISE: (see below for minutes): Exercises per grid below to improve mobility, strength, balance and coordination.  Required minimal verbal and manual cues to promote proper body alignment, promote proper body posture and promote proper

## 2023-08-08 ENCOUNTER — APPOINTMENT (OUTPATIENT)
Dept: PHYSICAL THERAPY | Age: 68
End: 2023-08-08
Payer: MEDICARE

## 2023-08-10 ENCOUNTER — HOSPITAL ENCOUNTER (OUTPATIENT)
Dept: PHYSICAL THERAPY | Age: 68
Setting detail: RECURRING SERIES
Discharge: HOME | End: 2023-08-13
Payer: MEDICARE

## 2023-08-10 PROCEDURE — 97110 THERAPEUTIC EXERCISES: CPT

## 2023-08-10 NOTE — THERAPY DISCHARGE
Citlali Rm  : 1955  Primary: Medicare Part A And B ([facility])  Secondary:  [address] [address]  Phone: [phone]  Fax: [phone] Plan Frequency: 1-2 times a week    Plan of Care/Certification Expiration Date: 23      >PT Visit Info:  Plan Frequency: 1-2 times a week  Plan of Care/Certification Expiration Date: 23      Visit Count:  14    OUTPATIENT PHYSICAL THERAPY:OP NOTE TYPE: Treatment Note and Discharge 8/10/2023       Episode  }Appt Desk             Treatment Diagnosis:  Pain in Right Knee (M25.561)  Stiffness of Right Knee, Not elsewhere classified (M25.661)  Difficulty in walking, Not elsewhere classified (R26.2)  Medical/Referring Diagnosis:  Primary osteoarthritis of right knee [M17.11]  Referring Physician:  Theresa Hamilton MD MD Orders:  PT Eval and Treat   Date of Onset:  No data recorded   Allergies:   Patient has no known allergies. Restrictions/Precautions:  No data recordedNo data recorded     Interventions Planned (Treatment may consist of any combination of the following):    Current Treatment Recommendations: Strengthening; ROM; Functional mobility training     >Subjective Comments: \"Im doing pretty good and I think im ready for discharge. \"      >Initial:    0/10 R knee >Post Session:     no increase  Medications Last Reviewed:  8/10/2023  Updated Objective Findings:  None Today  Goals: (Goals have been discussed and agreed upon with patient.)  Short-Term Functional Goals: Time Frame: 2 weeks   Ms. Leonard to be independent with HEP. (MET)  [place] without pain in the knees. (MET)  Pt to show full flexion in the R knee as prior level of function without pain. (MET)  Discharge Goals: Time Frame: 4 weeks   Pt able to return to normal recreational distances for walking at least 3 days a week without pain in the R knee. (MET)  Pt to show full AROM in all functional

## 2023-08-12 DIAGNOSIS — C50.112 MALIGNANT NEOPLASM OF CENTRAL PORTION OF LEFT BREAST IN FEMALE, ESTROGEN RECEPTOR POSITIVE (HCC): ICD-10-CM

## 2023-08-12 DIAGNOSIS — Z17.0 MALIGNANT NEOPLASM OF CENTRAL PORTION OF LEFT BREAST IN FEMALE, ESTROGEN RECEPTOR POSITIVE (HCC): ICD-10-CM

## 2023-08-15 ENCOUNTER — APPOINTMENT (OUTPATIENT)
Dept: PHYSICAL THERAPY | Age: 68
End: 2023-08-15
Payer: MEDICARE

## 2023-08-17 ENCOUNTER — APPOINTMENT (OUTPATIENT)
Dept: PHYSICAL THERAPY | Age: 68
End: 2023-08-17
Payer: MEDICARE

## 2023-08-22 ENCOUNTER — APPOINTMENT (OUTPATIENT)
Dept: PHYSICAL THERAPY | Age: 68
End: 2023-08-22
Payer: MEDICARE

## 2023-08-24 ENCOUNTER — APPOINTMENT (OUTPATIENT)
Dept: PHYSICAL THERAPY | Age: 68
End: 2023-08-24
Payer: MEDICARE

## 2023-08-29 ENCOUNTER — APPOINTMENT (OUTPATIENT)
Dept: PHYSICAL THERAPY | Age: 68
End: 2023-08-29
Payer: MEDICARE

## 2023-08-31 ENCOUNTER — APPOINTMENT (OUTPATIENT)
Dept: PHYSICAL THERAPY | Age: 68
End: 2023-08-31
Payer: MEDICARE

## 2023-09-05 ENCOUNTER — OFFICE VISIT (OUTPATIENT)
Dept: ONCOLOGY | Age: 68
End: 2023-09-05
Payer: MEDICARE

## 2023-09-05 ENCOUNTER — HOSPITAL ENCOUNTER (OUTPATIENT)
Dept: LAB | Age: 68
Discharge: HOME | End: 2023-09-08
Payer: MEDICARE

## 2023-09-05 VITALS
DIASTOLIC BLOOD PRESSURE: 71 MMHG | OXYGEN SATURATION: 98 % | TEMPERATURE: 97.7 F | SYSTOLIC BLOOD PRESSURE: 132 MMHG | WEIGHT: 212.1 LBS | RESPIRATION RATE: 20 BRPM | BODY MASS INDEX: 32.15 KG/M2 | HEIGHT: 68 IN | HEART RATE: 73 BPM

## 2023-09-05 DIAGNOSIS — Z79.811 LONG TERM (CURRENT) USE OF AROMATASE INHIBITORS: ICD-10-CM

## 2023-09-05 DIAGNOSIS — C50.912 MALIGNANT NEOPLASM OF LEFT BREAST IN FEMALE, ESTROGEN RECEPTOR POSITIVE, UNSPECIFIED SITE OF BREAST (HCC): Primary | ICD-10-CM

## 2023-09-05 DIAGNOSIS — Z17.0 MALIGNANT NEOPLASM OF LEFT BREAST IN FEMALE, ESTROGEN RECEPTOR POSITIVE, UNSPECIFIED SITE OF BREAST (HCC): Primary | ICD-10-CM

## 2023-09-05 DIAGNOSIS — C50.112 MALIGNANT NEOPLASM OF CENTRAL PORTION OF LEFT BREAST IN FEMALE, ESTROGEN RECEPTOR POSITIVE (HCC): Primary | ICD-10-CM

## 2023-09-05 DIAGNOSIS — Z17.0 MALIGNANT NEOPLASM OF CENTRAL PORTION OF LEFT BREAST IN FEMALE, ESTROGEN RECEPTOR POSITIVE (HCC): Primary | ICD-10-CM

## 2023-09-05 DIAGNOSIS — C50.112 MALIGNANT NEOPLASM OF CENTRAL PORTION OF LEFT BREAST IN FEMALE, ESTROGEN RECEPTOR POSITIVE (HCC): ICD-10-CM

## 2023-09-05 DIAGNOSIS — Z17.0 MALIGNANT NEOPLASM OF CENTRAL PORTION OF LEFT BREAST IN FEMALE, ESTROGEN RECEPTOR POSITIVE (HCC): ICD-10-CM

## 2023-09-05 LAB
ALBUMIN SERPL-MCNC: 3.9 G/DL (ref 3.2–4.6)
ALBUMIN/GLOB SERPL: 1.1 (ref 0.4–1.6)
ALP SERPL-CCNC: 91 U/L (ref 50–136)
ALT SERPL-CCNC: 26 U/L (ref 12–65)
ANION GAP SERPL CALC-SCNC: 7 MMOL/L (ref 2–11)
AST SERPL-CCNC: 18 U/L (ref 15–37)
BASOPHILS # BLD: 0 K/UL (ref 0–0.2)
BASOPHILS NFR BLD: 1 % (ref 0–2)
BILIRUB SERPL-MCNC: 0.4 MG/DL (ref 0.2–1.1)
BUN SERPL-MCNC: 20 MG/DL (ref 8–23)
CALCIUM SERPL-MCNC: 9 MG/DL (ref 8.3–10.4)
CHLORIDE SERPL-SCNC: 107 MMOL/L (ref 101–110)
CO2 SERPL-SCNC: 27 MMOL/L (ref 21–32)
CREAT SERPL-MCNC: 0.9 MG/DL (ref 0.6–1)
DIFFERENTIAL METHOD BLD: NORMAL
EOSINOPHIL # BLD: 0.1 K/UL (ref 0–0.8)
EOSINOPHIL NFR BLD: 2 % (ref 0.5–7.8)
ERYTHROCYTE [DISTWIDTH] IN BLOOD BY AUTOMATED COUNT: 13.8 % (ref 11.9–14.6)
GLOBULIN SER CALC-MCNC: 3.5 G/DL (ref 2.8–4.5)
GLUCOSE SERPL-MCNC: 113 MG/DL (ref 65–100)
HCT VFR BLD AUTO: 41.9 % (ref 35.8–46.3)
HGB BLD-MCNC: 13.5 G/DL (ref 11.7–15.4)
IMM GRANULOCYTES # BLD AUTO: 0 K/UL (ref 0–0.5)
IMM GRANULOCYTES NFR BLD AUTO: 0 % (ref 0–5)
LYMPHOCYTES # BLD: 1.2 K/UL (ref 0.5–4.6)
LYMPHOCYTES NFR BLD: 20 % (ref 13–44)
MCH RBC QN AUTO: 29.3 PG (ref 26.1–32.9)
MCHC RBC AUTO-ENTMCNC: 32.2 G/DL (ref 31.4–35)
MCV RBC AUTO: 91.1 FL (ref 82–102)
MONOCYTES # BLD: 0.4 K/UL (ref 0.1–1.3)
MONOCYTES NFR BLD: 8 % (ref 4–12)
NEUTS SEG # BLD: 4 K/UL (ref 1.7–8.2)
NEUTS SEG NFR BLD: 69 % (ref 43–78)
NRBC # BLD: 0.02 K/UL (ref 0–0.2)
PLATELET # BLD AUTO: 205 K/UL (ref 150–450)
PMV BLD AUTO: 10 FL (ref 9.4–12.3)
POTASSIUM SERPL-SCNC: 3.7 MMOL/L (ref 3.5–5.1)
PROT SERPL-MCNC: 7.4 G/DL (ref 6.3–8.2)
RBC # BLD AUTO: 4.6 M/UL (ref 4.05–5.2)
SODIUM SERPL-SCNC: 141 MMOL/L (ref 133–143)
WBC # BLD AUTO: 5.8 K/UL (ref 4.3–11.1)

## 2023-09-05 PROCEDURE — G8399 PT W/DXA RESULTS DOCUMENT: HCPCS | Performed by: INTERNAL MEDICINE

## 2023-09-05 PROCEDURE — 1123F ACP DISCUSS/DSCN MKR DOCD: CPT | Performed by: INTERNAL MEDICINE

## 2023-09-05 PROCEDURE — 85025 COMPLETE CBC W/AUTO DIFF WBC: CPT

## 2023-09-05 PROCEDURE — 1036F TOBACCO NON-USER: CPT | Performed by: INTERNAL MEDICINE

## 2023-09-05 PROCEDURE — 3017F COLORECTAL CA SCREEN DOC REV: CPT | Performed by: INTERNAL MEDICINE

## 2023-09-05 PROCEDURE — 99214 OFFICE O/P EST MOD 30 MIN: CPT | Performed by: INTERNAL MEDICINE

## 2023-09-05 PROCEDURE — 1090F PRES/ABSN URINE INCON ASSESS: CPT | Performed by: INTERNAL MEDICINE

## 2023-09-05 PROCEDURE — G8428 CUR MEDS NOT DOCUMENT: HCPCS | Performed by: INTERNAL MEDICINE

## 2023-09-05 PROCEDURE — G8417 CALC BMI ABV UP PARAM F/U: HCPCS | Performed by: INTERNAL MEDICINE

## 2023-09-05 PROCEDURE — 80053 COMPREHEN METABOLIC PANEL: CPT

## 2023-09-05 PROCEDURE — 36415 COLL VENOUS BLD VENIPUNCTURE: CPT

## 2023-09-05 RX ORDER — MELOXICAM 15 MG/1
15 TABLET ORAL DAILY
COMMUNITY
Start: 2023-05-30

## 2023-09-05 ASSESSMENT — PATIENT HEALTH QUESTIONNAIRE - PHQ9
1. LITTLE INTEREST OR PLEASURE IN DOING THINGS: 0
SUM OF ALL RESPONSES TO PHQ9 QUESTIONS 1 & 2: 0
SUM OF ALL RESPONSES TO PHQ QUESTIONS 1-9: 0
2. FEELING DOWN, DEPRESSED OR HOPELESS: 0

## 2023-09-05 NOTE — PROGRESS NOTES
[facility]: Office Visit Established Patient     Chief Complaint:     Chief Complaint   Patient presents with    Follow-up       History of Present Illness:  Ms. Gaston Gomez  is a 74 y.o. female who presents  today for evaluation regarding breast cancer. She initially presented for a routine bilateral screening mammogram on 2/24/21 which identified an area of suspected architectural distortion in the slightly lower, medial anterior left breast. Further evaluation  with diagnostic left mammogram and left breast ultrasound performed on 3/3/21 showed the area of indeterminate mammographic distortion without sonographic correlate. On 3/15/21, she underwent MRI of the bilateral breasts which demonstrated a 1 cm multilobular  retroareolar mass suspicious for malignancy, otherwise negative although repeat MRI was recommended in 1 year due to extensive background enhancement. She underwent biopsy of the suspicious mass on 3/18/21, this showed invasive ductal carcinoma, low  grade, ER 93%, ME 82%, HER2 negative. She was referred to Dr. Luis Youngblood, who recommended left breast lumpectomy and sentinel lymph node biopsy. Final pathology revealed low grade invasive ductal carcinoma with lobular features, 8 mm in greatest dimension. The posterior margin was positive for invasive carcinoma so additional tissue was taken and margins were negative. The left sentinel node was negative for metastatic carcinoma. Because her tumor was under 1 cm, low grade, strongly ER/ME positive and  HER2 negative, the likelihood of benefit from adjuvant chemotherapy is virtually zero. Therefore, we will recommend antiestrogen therapy alone. She is post-menopausal, so a prescription was given for anastrozole. She should also meet with radiation  oncology, we will refer. She returns today for follow up. She is doing well overall.   She remains on Arimidex with good tolerance, she notes some arthralgias that are

## 2023-09-05 NOTE — PATIENT INSTRUCTIONS
Patient Instructions from Today's Visit    Reason for Visit:  Follow up Breast Cancer     Diagnosis Information:  [email or web address] [email or web address]-us/[email or web address]-answers-patient-education-materials/tghg-[email or web address]-hewi-sheets    Plan:  Lab results reviewed   [phone] to schedule Mammogram (1/7/24 or after) and MRI (around 7/10/24)    Follow Up:   Follow up in 6 months     Recent Lab Results:  Hospital Outpatient Visit on 09/05/2023   Component Date Value Ref Range Status    WBC 09/05/2023 5.8  4.3 - 11.1 K/uL Final    RBC 09/05/2023 4.60  4.05 - 5.2 M/uL Final    Hemoglobin 09/05/2023 13.5  11.7 - 15.4 g/dL Final    Hematocrit 09/05/2023 41.9  35.8 - 46.3 % Final    MCV 09/05/2023 91.1  82.0 - 102.0 FL Final    MCH 09/05/2023 29.3  26.1 - 32.9 PG Final    MCHC 09/05/2023 32.2  31.4 - 35.0 g/dL Final    RDW 09/05/2023 13.8  11.9 - 14.6 % Final    Platelets 83/52/7013 205  150 - 450 K/uL Final    MPV 09/05/2023 10.0  9.4 - 12.3 FL Final    nRBC 09/05/2023 0.02  0.0 - 0.2 K/uL Final    **Note: Absolute NRBC parameter is now reported with Hemogram**    Differential Type 09/05/2023 AUTOMATED    Final    Neutrophils % 09/05/2023 69  43 - 78 % Final    Lymphocytes % 09/05/2023 20  13 - 44 % Final    Monocytes % 09/05/2023 8  4.0 - 12.0 % Final    Eosinophils % 09/05/2023 2  0.5 - 7.8 % Final    Basophils % 09/05/2023 1  0.0 - 2.0 % Final    Immature Granulocytes 09/05/2023 0  0.0 - 5.0 % Final    Neutrophils Absolute 09/05/2023 4.0  1.7 - 8.2 K/UL Final    Lymphocytes Absolute 09/05/2023 1.2  0.5 - 4.6 K/UL Final    Monocytes Absolute 09/05/2023 0.4  0.1 - 1.3 K/UL Final    Eosinophils Absolute 09/05/2023 0.1  0.0 - 0.8 K/UL Final    Basophils Absolute 09/05/2023 0.0  0.0 - 0.2 K/UL Final    Absolute Immature Granulocyte 09/05/2023 0.0  0.0 - 0.5 K/UL Final     Treatment Summary has been discussed and given to patient:

## 2023-09-06 DIAGNOSIS — Z17.0 MALIGNANT NEOPLASM OF CENTRAL PORTION OF LEFT BREAST IN FEMALE, ESTROGEN RECEPTOR POSITIVE (HCC): ICD-10-CM

## 2023-09-06 DIAGNOSIS — C50.112 MALIGNANT NEOPLASM OF CENTRAL PORTION OF LEFT BREAST IN FEMALE, ESTROGEN RECEPTOR POSITIVE (HCC): ICD-10-CM

## 2023-09-20 ENCOUNTER — TELEPHONE (OUTPATIENT)
Dept: ONCOLOGY | Age: 68
End: 2023-09-20

## 2023-09-20 DIAGNOSIS — C50.112 MALIGNANT NEOPLASM OF CENTRAL PORTION OF LEFT BREAST IN FEMALE, ESTROGEN RECEPTOR POSITIVE (HCC): ICD-10-CM

## 2023-09-20 DIAGNOSIS — Z17.0 MALIGNANT NEOPLASM OF CENTRAL PORTION OF LEFT BREAST IN FEMALE, ESTROGEN RECEPTOR POSITIVE (HCC): ICD-10-CM

## 2023-09-20 RX ORDER — ANASTROZOLE 1 MG/1
1 TABLET ORAL DAILY
Qty: 90 TABLET | Refills: 3 | Status: SHIPPED | OUTPATIENT
Start: 2023-09-20

## 2023-09-20 RX ORDER — ANASTROZOLE 1 MG/1
1 TABLET ORAL DAILY
Qty: 30 TABLET | Refills: 5 | Status: SHIPPED | OUTPATIENT
Start: 2023-09-20 | End: 2023-09-20 | Stop reason: SDUPTHER

## 2023-09-20 NOTE — TELEPHONE ENCOUNTER
Physician provider: Sunita Shukla MD  Reason for today's call: Medication refill  Last office visit: n/a    Patient notified that their information will be routed to the [facility] clinical triage team for review. Patient is advised that they will receive a phone call from the triage department. If symptoms worsen before receiving a call back, the patient has been advised to proceed to the nearest ED. Pt called to F/U on request for medication refill of: Anastrozole 1 MG sent to: [facility] in [place] on [address] [place] [address].

## 2023-09-20 NOTE — TELEPHONE ENCOUNTER
Medication Requested: Anastrazole    Date last prescribed: 2/2023    [facility]ed [facility]acy[facility]    Action Taken: Refill sent. Patient notified.

## 2023-10-25 RX ORDER — ANASTROZOLE 1 MG/1
1 TABLET ORAL DAILY
Qty: 90 TABLET | OUTPATIENT
Start: 2023-10-25

## 2023-10-30 RX ORDER — ANASTROZOLE 1 MG/1
1 TABLET ORAL DAILY
Qty: 90 TABLET | OUTPATIENT
Start: 2023-10-30

## 2023-10-30 RX ORDER — ANASTROZOLE 1 MG/1
1 TABLET ORAL DAILY
Qty: 30 TABLET | Refills: 5 | OUTPATIENT
Start: 2023-10-30

## 2023-11-13 RX ORDER — ANASTROZOLE 1 MG/1
TABLET ORAL
Qty: 30 TABLET | Refills: 5 | OUTPATIENT
Start: 2023-11-13

## 2023-11-13 RX ORDER — ANASTROZOLE 1 MG/1
1 TABLET ORAL DAILY
Qty: 90 TABLET | OUTPATIENT
Start: 2023-11-13

## 2023-11-14 ENCOUNTER — APPOINTMENT (RX ONLY)
Dept: URBAN - METROPOLITAN AREA CLINIC 329 | Facility: CLINIC | Age: 68
Setting detail: DERMATOLOGY
End: 2023-11-14

## 2023-11-14 DIAGNOSIS — L82.1 OTHER SEBORRHEIC KERATOSIS: ICD-10-CM | Status: STABLE

## 2023-11-14 DIAGNOSIS — L57.0 ACTINIC KERATOSIS: ICD-10-CM | Status: INADEQUATELY CONTROLLED

## 2023-11-14 PROCEDURE — 99212 OFFICE O/P EST SF 10 MIN: CPT

## 2023-11-14 PROCEDURE — ? COUNSELING

## 2023-11-14 ASSESSMENT — LOCATION ZONE DERM: LOCATION ZONE: ARM

## 2023-11-14 ASSESSMENT — LOCATION SIMPLE DESCRIPTION DERM: LOCATION SIMPLE: LEFT POSTERIOR UPPER ARM

## 2023-11-14 ASSESSMENT — LOCATION DETAILED DESCRIPTION DERM: LOCATION DETAILED: LEFT DISTAL LATERAL POSTERIOR UPPER ARM

## 2023-12-29 NOTE — PROGRESS NOTES
Name: Noe Chinchilla  YOB: 1955  Gender: female  MRN: [id number]        CC: Bilateral Knee Pain     PROCEDURE:  3  of 3 bilateral knee HP    Diagnosis:   Encounter Diagnosis   Name Primary? Bilateral primary osteoarthritis of knee Yes          Mindray US unit with variable frequency (6.0-15.0 MHz) linear transducer was used to visualize the intracondylar notch, retropatellar fat pad, patella tendon, patella, tibia, and to ensure proper intra-articular needle placement. Injection image was saved in patient's permanent chart. Procedure Note: The patient was placed in upright position with both knees hanging freely from exam table. The knees were prepped in sterile fashion using alcohol wipe. Using the [employer] ultrasound guidance, a 22 gauge needle was then introduced into both knee joints from an infrapatellar approach and 2 mL Euflexxa was injected freely into each knee. The needle was then removed, pressure hemostasis achieved, injection sites were cleansed with alcohol wipe, and dressed with band aid. The patient tolerated the procedure without complication. The patient will follow up as scheduled. No protocol for requested medication     Medication: gabapentin  Last office visit date: 12/5/2023  Pharmacy: [facility] [id number] - [place], [place] - [address]    Order pended, routed to clinician for review.

## 2024-02-02 ENCOUNTER — HOSPITAL ENCOUNTER (OUTPATIENT)
Dept: MAMMOGRAPHY | Age: 69
End: 2024-02-02
Attending: INTERNAL MEDICINE
Payer: MEDICARE

## 2024-02-02 DIAGNOSIS — Z12.31 VISIT FOR SCREENING MAMMOGRAM: ICD-10-CM

## 2024-02-02 PROCEDURE — 77063 BREAST TOMOSYNTHESIS BI: CPT

## 2024-03-04 DIAGNOSIS — C50.912 MALIGNANT NEOPLASM OF LEFT BREAST IN FEMALE, ESTROGEN RECEPTOR POSITIVE, UNSPECIFIED SITE OF BREAST (HCC): Primary | ICD-10-CM

## 2024-03-04 DIAGNOSIS — Z17.0 MALIGNANT NEOPLASM OF LEFT BREAST IN FEMALE, ESTROGEN RECEPTOR POSITIVE, UNSPECIFIED SITE OF BREAST (HCC): Primary | ICD-10-CM

## 2024-03-05 ENCOUNTER — HOSPITAL ENCOUNTER (OUTPATIENT)
Dept: LAB | Age: 69
Discharge: HOME | End: 2024-03-08
Payer: MEDICARE

## 2024-03-05 ENCOUNTER — OFFICE VISIT (OUTPATIENT)
Dept: ONCOLOGY | Age: 69
End: 2024-03-05
Payer: MEDICARE

## 2024-03-05 VITALS
HEART RATE: 91 BPM | TEMPERATURE: 98.5 F | HEIGHT: 68 IN | OXYGEN SATURATION: 97 % | BODY MASS INDEX: 31.98 KG/M2 | WEIGHT: 211 LBS | DIASTOLIC BLOOD PRESSURE: 78 MMHG | RESPIRATION RATE: 18 BRPM | SYSTOLIC BLOOD PRESSURE: 115 MMHG

## 2024-03-05 DIAGNOSIS — Z17.0 MALIGNANT NEOPLASM OF LEFT BREAST IN FEMALE, ESTROGEN RECEPTOR POSITIVE, UNSPECIFIED SITE OF BREAST (HCC): ICD-10-CM

## 2024-03-05 DIAGNOSIS — C50.912 MALIGNANT NEOPLASM OF LEFT BREAST IN FEMALE, ESTROGEN RECEPTOR POSITIVE, UNSPECIFIED SITE OF BREAST (HCC): ICD-10-CM

## 2024-03-05 DIAGNOSIS — Z79.811 LONG TERM (CURRENT) USE OF AROMATASE INHIBITORS: ICD-10-CM

## 2024-03-05 DIAGNOSIS — C50.112 MALIGNANT NEOPLASM OF CENTRAL PORTION OF LEFT BREAST IN FEMALE, ESTROGEN RECEPTOR POSITIVE (HCC): Primary | ICD-10-CM

## 2024-03-05 DIAGNOSIS — Z17.0 MALIGNANT NEOPLASM OF CENTRAL PORTION OF LEFT BREAST IN FEMALE, ESTROGEN RECEPTOR POSITIVE (HCC): Primary | ICD-10-CM

## 2024-03-05 DIAGNOSIS — R92.30 DENSE BREAST TISSUE: ICD-10-CM

## 2024-03-05 LAB
ALBUMIN SERPL-MCNC: 3.8 G/DL (ref 3.2–4.6)
ALBUMIN/GLOB SERPL: 1.1 (ref 0.4–1.6)
ALP SERPL-CCNC: 90 U/L (ref 50–136)
ALT SERPL-CCNC: 23 U/L (ref 12–65)
ANION GAP SERPL CALC-SCNC: 4 MMOL/L (ref 2–11)
AST SERPL-CCNC: 16 U/L (ref 15–37)
BASOPHILS # BLD: 0.1 K/UL (ref 0–0.2)
BASOPHILS NFR BLD: 1 % (ref 0–2)
BILIRUB SERPL-MCNC: 0.4 MG/DL (ref 0.2–1.1)
BUN SERPL-MCNC: 23 MG/DL (ref 8–23)
CALCIUM SERPL-MCNC: 9.1 MG/DL (ref 8.3–10.4)
CHLORIDE SERPL-SCNC: 111 MMOL/L (ref 103–113)
CO2 SERPL-SCNC: 24 MMOL/L (ref 21–32)
CREAT SERPL-MCNC: 0.9 MG/DL (ref 0.6–1)
DIFFERENTIAL METHOD BLD: NORMAL
EOSINOPHIL # BLD: 0.2 K/UL (ref 0–0.8)
EOSINOPHIL NFR BLD: 3 % (ref 0.5–7.8)
ERYTHROCYTE [DISTWIDTH] IN BLOOD BY AUTOMATED COUNT: 13.4 % (ref 11.9–14.6)
GLOBULIN SER CALC-MCNC: 3.4 G/DL (ref 2.8–4.5)
GLUCOSE SERPL-MCNC: 105 MG/DL (ref 65–100)
HCT VFR BLD AUTO: 40.5 % (ref 35.8–46.3)
HGB BLD-MCNC: 13.2 G/DL (ref 11.7–15.4)
IMM GRANULOCYTES # BLD AUTO: 0 K/UL (ref 0–0.5)
LYMPHOCYTES # BLD: 1.2 K/UL (ref 0.5–4.6)
MCH RBC QN AUTO: 28.8 PG (ref 26.1–32.9)
MCHC RBC AUTO-ENTMCNC: 32.6 G/DL (ref 31.4–35)
MCV RBC AUTO: 88.4 FL (ref 82–102)
MONOCYTES # BLD: 0.5 K/UL (ref 0.1–1.3)
MONOCYTES NFR BLD: 9 % (ref 4–12)
NEUTS SEG NFR BLD: 66 % (ref 43–78)
NRBC # BLD: 0 K/UL (ref 0–0.2)
POTASSIUM SERPL-SCNC: 4 MMOL/L (ref 3.5–5.1)
PROT SERPL-MCNC: 7.2 G/DL (ref 6.3–8.2)
RBC # BLD AUTO: 4.58 M/UL (ref 4.05–5.2)
SODIUM SERPL-SCNC: 139 MMOL/L (ref 136–146)
WBC # BLD AUTO: 5.8 K/UL (ref 4.3–11.1)

## 2024-03-05 PROCEDURE — 36415 COLL VENOUS BLD VENIPUNCTURE: CPT

## 2024-03-05 PROCEDURE — 1123F ACP DISCUSS/DSCN MKR DOCD: CPT | Performed by: NURSE PRACTITIONER

## 2024-03-05 PROCEDURE — 3017F COLORECTAL CA SCREEN DOC REV: CPT | Performed by: NURSE PRACTITIONER

## 2024-03-05 PROCEDURE — 1090F PRES/ABSN URINE INCON ASSESS: CPT | Performed by: NURSE PRACTITIONER

## 2024-03-05 PROCEDURE — 85025 COMPLETE CBC W/AUTO DIFF WBC: CPT

## 2024-03-05 PROCEDURE — 1036F TOBACCO NON-USER: CPT | Performed by: NURSE PRACTITIONER

## 2024-03-05 PROCEDURE — G8399 PT W/DXA RESULTS DOCUMENT: HCPCS | Performed by: NURSE PRACTITIONER

## 2024-03-05 PROCEDURE — 80053 COMPREHEN METABOLIC PANEL: CPT

## 2024-03-05 PROCEDURE — G8484 FLU IMMUNIZE NO ADMIN: HCPCS | Performed by: NURSE PRACTITIONER

## 2024-03-05 PROCEDURE — G8427 DOCREV CUR MEDS BY ELIG CLIN: HCPCS | Performed by: NURSE PRACTITIONER

## 2024-03-05 PROCEDURE — G8417 CALC BMI ABV UP PARAM F/U: HCPCS | Performed by: NURSE PRACTITIONER

## 2024-03-05 PROCEDURE — 99214 OFFICE O/P EST MOD 30 MIN: CPT | Performed by: NURSE PRACTITIONER

## 2024-03-05 ASSESSMENT — PATIENT HEALTH QUESTIONNAIRE - PHQ9
1. LITTLE INTEREST OR PLEASURE IN DOING THINGS: 0
SUM OF ALL RESPONSES TO PHQ QUESTIONS 1-9: 0
SUM OF ALL RESPONSES TO PHQ QUESTIONS 1-9: 0
2. FEELING DOWN, DEPRESSED OR HOPELESS: 0
SUM OF ALL RESPONSES TO PHQ9 QUESTIONS 1 & 2: 0
SUM OF ALL RESPONSES TO PHQ QUESTIONS 1-9: 0
SUM OF ALL RESPONSES TO PHQ QUESTIONS 1-9: 0

## 2024-03-05 NOTE — PROGRESS NOTES
FOLLOW-UP   BREAST MRI IS RECOMMENDED IN ONE YEAR FOR THIS 65-YEAR-OLD WITH DENSE, COMPLEX   BREAST PARENCHYMA AND A PERSISTENT 27% LIFETIME BREAST CANCER RISK (SEE ABOVE).           LEFT: BI-RADS Assessment Category 4: Suspicious Finding- Biopsy should be   considered.   BILATERAL BACKGROUND PARENCHYMAL ENHANCEMENT: BI-RADS Assessment Category 3:   Probably benign- Short-interval follow-up suggested (one year).         Pathology:                            ASSESSMENT:   Diagnosis Orders   1. Malignant neoplasm of central portion of left breast in female, estrogen receptor positive (HCC)  MRI BREAST BILATERAL W WO CONTRAST      2. Long term (current) use of aromatase inhibitors        3. Dense breast tissue  MRI BREAST BILATERAL W WO CONTRAST                  PLAN:  Lab studies were personally reviewed.     Pertinent old records were reviewed.      Breast cancer: left breast, low grade IDC, 8mm on lumpectomy, negative sentinel node, ER 93%, OK 82%, HER2 negative.  Because her tumor was under 1 cm, low grade, strongly ER/OK positive and HER2 negative, the likelihood of benefit from adjuvant chemotherapy  is virtually zero.  Therefore, we will recommend antiestrogen therapy alone.  She is post-menopausal, so a prescription was given for anastrozole.  She should also meet with radiation oncology, we will refer.  She completed XRT in June 2021.  She started Arimidex mid 2021.      She is here today for follow up, on Arimidex.  She has been feeling well overall since last seen.  She has joint aches in her knees, takes tart cherry and Mobic prn with improvement.  She has also been walking more which is helpful.  Her appetite is normal.  She is trying to lose weight through walking/diet changes.  She denies any shortness of breath.  She denies any breast concerns or changes.  She denies any fevers or other infectious symptoms.  She denies any new pain.  Mammogram February 2024 negative, repeat February 2025.  DEXA July

## 2024-04-06 DIAGNOSIS — Z17.0 MALIGNANT NEOPLASM OF CENTRAL PORTION OF LEFT BREAST IN FEMALE, ESTROGEN RECEPTOR POSITIVE (HCC): ICD-10-CM

## 2024-04-06 DIAGNOSIS — C50.112 MALIGNANT NEOPLASM OF CENTRAL PORTION OF LEFT BREAST IN FEMALE, ESTROGEN RECEPTOR POSITIVE (HCC): ICD-10-CM

## 2024-04-08 RX ORDER — ANASTROZOLE 1 MG/1
1 TABLET ORAL DAILY
Qty: 30 TABLET | OUTPATIENT
Start: 2024-04-08

## 2024-07-23 ENCOUNTER — APPOINTMENT (RX ONLY)
Dept: URBAN - METROPOLITAN AREA CLINIC 329 | Facility: CLINIC | Age: 69
Setting detail: DERMATOLOGY
End: 2024-07-23

## 2024-07-23 ENCOUNTER — RX ONLY (OUTPATIENT)
Age: 69
Setting detail: RX ONLY
End: 2024-07-23

## 2024-07-23 DIAGNOSIS — L85.3 XEROSIS CUTIS: ICD-10-CM

## 2024-07-23 DIAGNOSIS — L68.0 HIRSUTISM: ICD-10-CM | Status: INADEQUATELY CONTROLLED

## 2024-07-23 DIAGNOSIS — D22 MELANOCYTIC NEVI: ICD-10-CM

## 2024-07-23 DIAGNOSIS — L81.4 OTHER MELANIN HYPERPIGMENTATION: ICD-10-CM

## 2024-07-23 DIAGNOSIS — L82.1 OTHER SEBORRHEIC KERATOSIS: ICD-10-CM

## 2024-07-23 DIAGNOSIS — D18.0 HEMANGIOMA: ICD-10-CM

## 2024-07-23 PROBLEM — D18.01 HEMANGIOMA OF SKIN AND SUBCUTANEOUS TISSUE: Status: ACTIVE | Noted: 2024-07-23

## 2024-07-23 PROBLEM — D22.5 MELANOCYTIC NEVI OF TRUNK: Status: ACTIVE | Noted: 2024-07-23

## 2024-07-23 PROCEDURE — ? PRESCRIPTION MEDICATION MANAGEMENT

## 2024-07-23 PROCEDURE — ? COUNSELING

## 2024-07-23 PROCEDURE — 99213 OFFICE O/P EST LOW 20 MIN: CPT

## 2024-07-23 PROCEDURE — ? SUNSCREEN RECOMMENDATIONS

## 2024-07-23 RX ORDER — EFLORNITHINE HYDROCHLORIDE 139 MG/G
CREAM TOPICAL BID
Qty: 45 | Refills: 6 | Status: CANCELLED
Stop reason: CLARIF

## 2024-07-23 RX ORDER — PHARMACY COMPOUNDING ACCESSORY
EACH MISCELLANEOUS
Qty: 45 | Refills: 6 | Status: ERX | COMMUNITY
Start: 2024-07-23

## 2024-07-23 ASSESSMENT — LOCATION DETAILED DESCRIPTION DERM
LOCATION DETAILED: SUPERIOR THORACIC SPINE
LOCATION DETAILED: RIGHT CHIN
LOCATION DETAILED: RIGHT MEDIAL BREAST 1-2:00 REGION
LOCATION DETAILED: EPIGASTRIC SKIN
LOCATION DETAILED: RIGHT SUPERIOR MEDIAL UPPER BACK
LOCATION DETAILED: RIGHT SUPERIOR UPPER BACK
LOCATION DETAILED: INFERIOR THORACIC SPINE

## 2024-07-23 ASSESSMENT — LOCATION SIMPLE DESCRIPTION DERM
LOCATION SIMPLE: RIGHT UPPER BACK
LOCATION SIMPLE: CHIN
LOCATION SIMPLE: UPPER BACK
LOCATION SIMPLE: ABDOMEN
LOCATION SIMPLE: RIGHT BREAST

## 2024-07-23 ASSESSMENT — LOCATION ZONE DERM
LOCATION ZONE: FACE
LOCATION ZONE: TRUNK

## 2024-07-23 NOTE — PROCEDURE: PRESCRIPTION MEDICATION MANAGEMENT
Initiate Treatment: Vaniqa
Plan: Patient never received Vaniqa last year when we prescribed for her.
Render In Strict Bullet Format?: No
Detail Level: Zone

## 2024-08-05 ENCOUNTER — HOSPITAL ENCOUNTER (OUTPATIENT)
Dept: MRI IMAGING | Age: 69
Discharge: HOME | End: 2024-08-08
Payer: MEDICARE

## 2024-08-05 DIAGNOSIS — R92.30 DENSE BREAST TISSUE: ICD-10-CM

## 2024-08-05 DIAGNOSIS — Z17.0 MALIGNANT NEOPLASM OF CENTRAL PORTION OF LEFT BREAST IN FEMALE, ESTROGEN RECEPTOR POSITIVE (HCC): ICD-10-CM

## 2024-08-05 DIAGNOSIS — C50.112 MALIGNANT NEOPLASM OF CENTRAL PORTION OF LEFT BREAST IN FEMALE, ESTROGEN RECEPTOR POSITIVE (HCC): ICD-10-CM

## 2024-08-05 PROCEDURE — 6360000004 HC RX CONTRAST MEDICATION: Performed by: NURSE PRACTITIONER

## 2024-08-05 PROCEDURE — 2580000003 HC RX 258: Performed by: NURSE PRACTITIONER

## 2024-08-05 PROCEDURE — A9579 GAD-BASE MR CONTRAST NOS,1ML: HCPCS | Performed by: NURSE PRACTITIONER

## 2024-08-05 PROCEDURE — C8908 MRI W/O FOL W/CONT, BREAST,: HCPCS

## 2024-08-05 RX ORDER — SODIUM CHLORIDE 0.9 % (FLUSH) 0.9 %
30 SYRINGE (ML) INJECTION AS NEEDED
Status: DISCONTINUED | OUTPATIENT
Start: 2024-08-05 | End: 2024-08-09 | Stop reason: HOSPADM

## 2024-08-05 RX ADMIN — SODIUM CHLORIDE, PRESERVATIVE FREE 30 ML: 5 INJECTION INTRAVENOUS at 19:20

## 2024-08-05 RX ADMIN — GADOTERIDOL 20 ML: 279.3 INJECTION, SOLUTION INTRAVENOUS at 19:20

## 2024-08-29 DIAGNOSIS — Z17.0 MALIGNANT NEOPLASM OF CENTRAL PORTION OF LEFT BREAST IN FEMALE, ESTROGEN RECEPTOR POSITIVE (HCC): Primary | ICD-10-CM

## 2024-08-29 DIAGNOSIS — C50.112 MALIGNANT NEOPLASM OF CENTRAL PORTION OF LEFT BREAST IN FEMALE, ESTROGEN RECEPTOR POSITIVE (HCC): Primary | ICD-10-CM

## 2024-09-06 ENCOUNTER — OFFICE VISIT (OUTPATIENT)
Dept: ONCOLOGY | Age: 69
End: 2024-09-06
Payer: MEDICARE

## 2024-09-06 ENCOUNTER — HOSPITAL ENCOUNTER (OUTPATIENT)
Dept: LAB | Age: 69
Discharge: HOME | End: 2024-09-09
Payer: MEDICARE

## 2024-09-06 VITALS
SYSTOLIC BLOOD PRESSURE: 119 MMHG | TEMPERATURE: 98 F | DIASTOLIC BLOOD PRESSURE: 61 MMHG | RESPIRATION RATE: 14 BRPM | HEIGHT: 67 IN | OXYGEN SATURATION: 99 % | WEIGHT: 207.3 LBS | HEART RATE: 81 BPM | BODY MASS INDEX: 32.54 KG/M2

## 2024-09-06 DIAGNOSIS — Z17.0 MALIGNANT NEOPLASM OF CENTRAL PORTION OF LEFT BREAST IN FEMALE, ESTROGEN RECEPTOR POSITIVE (HCC): ICD-10-CM

## 2024-09-06 DIAGNOSIS — C50.112 MALIGNANT NEOPLASM OF CENTRAL PORTION OF LEFT BREAST IN FEMALE, ESTROGEN RECEPTOR POSITIVE (HCC): ICD-10-CM

## 2024-09-06 DIAGNOSIS — Z79.811 LONG TERM (CURRENT) USE OF AROMATASE INHIBITORS: ICD-10-CM

## 2024-09-06 DIAGNOSIS — Z12.31 ENCOUNTER FOR SCREENING MAMMOGRAM FOR BREAST CANCER: ICD-10-CM

## 2024-09-06 DIAGNOSIS — Z13.820 SCREENING FOR OSTEOPOROSIS: ICD-10-CM

## 2024-09-06 DIAGNOSIS — Z17.0 MALIGNANT NEOPLASM OF CENTRAL PORTION OF LEFT BREAST IN FEMALE, ESTROGEN RECEPTOR POSITIVE (HCC): Primary | ICD-10-CM

## 2024-09-06 DIAGNOSIS — C50.112 MALIGNANT NEOPLASM OF CENTRAL PORTION OF LEFT BREAST IN FEMALE, ESTROGEN RECEPTOR POSITIVE (HCC): Primary | ICD-10-CM

## 2024-09-06 DIAGNOSIS — Z17.0 MALIGNANT NEOPLASM OF LEFT BREAST IN FEMALE, ESTROGEN RECEPTOR POSITIVE, UNSPECIFIED SITE OF BREAST (HCC): ICD-10-CM

## 2024-09-06 DIAGNOSIS — C50.912 MALIGNANT NEOPLASM OF LEFT BREAST IN FEMALE, ESTROGEN RECEPTOR POSITIVE, UNSPECIFIED SITE OF BREAST (HCC): ICD-10-CM

## 2024-09-06 DIAGNOSIS — R92.30 DENSE BREAST TISSUE: ICD-10-CM

## 2024-09-06 LAB
ALBUMIN SERPL-MCNC: 4.1 G/DL (ref 3.2–4.6)
ALBUMIN/GLOB SERPL: 1.4 (ref 1–1.9)
ALP SERPL-CCNC: 101 U/L (ref 35–104)
ALT SERPL-CCNC: 17 U/L (ref 12–65)
ANION GAP SERPL CALC-SCNC: 10 MMOL/L (ref 9–18)
AST SERPL-CCNC: 23 U/L (ref 15–37)
BASOPHILS # BLD: 0.1 K/UL (ref 0–0.2)
BASOPHILS NFR BLD: 1 % (ref 0–2)
BILIRUB SERPL-MCNC: 0.3 MG/DL (ref 0–1.2)
BUN SERPL-MCNC: 24 MG/DL (ref 8–23)
CALCIUM SERPL-MCNC: 9.7 MG/DL (ref 8.8–10.2)
CHLORIDE SERPL-SCNC: 105 MMOL/L (ref 98–107)
CO2 SERPL-SCNC: 25 MMOL/L (ref 20–28)
CREAT SERPL-MCNC: 0.97 MG/DL (ref 0.6–1.1)
DIFFERENTIAL METHOD BLD: NORMAL
EOSINOPHIL # BLD: 0.1 K/UL (ref 0–0.8)
EOSINOPHIL NFR BLD: 3 % (ref 0.5–7.8)
ERYTHROCYTE [DISTWIDTH] IN BLOOD BY AUTOMATED COUNT: 13.4 % (ref 11.9–14.6)
GLOBULIN SER CALC-MCNC: 2.9 G/DL (ref 2.3–3.5)
GLUCOSE SERPL-MCNC: 131 MG/DL (ref 70–99)
HCT VFR BLD AUTO: 41.1 % (ref 35.8–46.3)
HGB BLD-MCNC: 13.3 G/DL (ref 11.7–15.4)
IMM GRANULOCYTES # BLD AUTO: 0 K/UL (ref 0–0.5)
IMM GRANULOCYTES NFR BLD AUTO: 0 % (ref 0–5)
LYMPHOCYTES # BLD: 0.9 K/UL (ref 0.5–4.6)
LYMPHOCYTES NFR BLD: 18 % (ref 13–44)
MCH RBC QN AUTO: 29.1 PG (ref 26.1–32.9)
MCHC RBC AUTO-ENTMCNC: 32.4 G/DL (ref 31.4–35)
MCV RBC AUTO: 89.9 FL (ref 82–102)
MONOCYTES # BLD: 0.5 K/UL (ref 0.1–1.3)
MONOCYTES NFR BLD: 10 % (ref 4–12)
NEUTS SEG # BLD: 3.7 K/UL (ref 1.7–8.2)
NEUTS SEG NFR BLD: 68 % (ref 43–78)
NRBC # BLD: 0 K/UL (ref 0–0.2)
PLATELET # BLD AUTO: 206 K/UL (ref 150–450)
PMV BLD AUTO: 10.1 FL (ref 9.4–12.3)
POTASSIUM SERPL-SCNC: 4.2 MMOL/L (ref 3.5–5.1)
PROT SERPL-MCNC: 7 G/DL (ref 6.3–8.2)
RBC # BLD AUTO: 4.57 M/UL (ref 4.05–5.2)
SODIUM SERPL-SCNC: 140 MMOL/L (ref 136–145)
WBC # BLD AUTO: 5.3 K/UL (ref 4.3–11.1)

## 2024-09-06 PROCEDURE — 1123F ACP DISCUSS/DSCN MKR DOCD: CPT | Performed by: INTERNAL MEDICINE

## 2024-09-06 PROCEDURE — 1090F PRES/ABSN URINE INCON ASSESS: CPT | Performed by: INTERNAL MEDICINE

## 2024-09-06 PROCEDURE — 85025 COMPLETE CBC W/AUTO DIFF WBC: CPT

## 2024-09-06 PROCEDURE — 80053 COMPREHEN METABOLIC PANEL: CPT

## 2024-09-06 PROCEDURE — 3017F COLORECTAL CA SCREEN DOC REV: CPT | Performed by: INTERNAL MEDICINE

## 2024-09-06 PROCEDURE — 36415 COLL VENOUS BLD VENIPUNCTURE: CPT

## 2024-09-06 PROCEDURE — 1036F TOBACCO NON-USER: CPT | Performed by: INTERNAL MEDICINE

## 2024-09-06 PROCEDURE — G8428 CUR MEDS NOT DOCUMENT: HCPCS | Performed by: INTERNAL MEDICINE

## 2024-09-06 PROCEDURE — G8399 PT W/DXA RESULTS DOCUMENT: HCPCS | Performed by: INTERNAL MEDICINE

## 2024-09-06 PROCEDURE — G8417 CALC BMI ABV UP PARAM F/U: HCPCS | Performed by: INTERNAL MEDICINE

## 2024-09-06 PROCEDURE — 99214 OFFICE O/P EST MOD 30 MIN: CPT | Performed by: INTERNAL MEDICINE

## 2024-09-06 RX ORDER — ANASTROZOLE 1 MG/1
1 TABLET ORAL DAILY
Qty: 90 TABLET | Refills: 3 | Status: SHIPPED | OUTPATIENT
Start: 2024-09-06

## 2024-09-06 NOTE — PROGRESS NOTES
[facility]: Office Visit Established Patient     Chief Complaint:     Chief Complaint   Patient presents with    Follow-up       History of Present Illness:  Ms. Leonard  is a 68 y.o. female who presents  today for evaluation regarding breast cancer.  She initially presented for a routine bilateral screening mammogram on 2/24/21 which identified an area of suspected architectural distortion in the slightly lower, medial anterior left breast. Further evaluation  with diagnostic left mammogram and left breast ultrasound performed on 3/3/21 showed the area of indeterminate mammographic distortion without sonographic correlate.  On 3/15/21, she underwent MRI of the bilateral breasts which demonstrated a 1 cm multilobular  retroareolar mass suspicious for malignancy, otherwise negative although repeat MRI was recommended in 1 year due to extensive background enhancement.  She underwent biopsy of the suspicious mass on 3/18/21, this showed invasive ductal carcinoma, low  grade, ER 93%, HI 82%, HER2 negative.  She was referred to Dr. Mcrae, who recommended left breast lumpectomy and sentinel lymph node biopsy.  Final pathology revealed low grade invasive ductal carcinoma with lobular features, 8 mm in greatest dimension.   The posterior margin was positive for invasive carcinoma so additional tissue was taken and margins were negative. The left sentinel node was negative for metastatic carcinoma.  Because her tumor was under 1 cm, low grade, strongly ER/HI positive and  HER2 negative, the likelihood of benefit from adjuvant chemotherapy is virtually zero.  Therefore, we will recommend antiestrogen therapy alone.  She is post-menopausal, so a prescription was given for anastrozole.  She should also meet with radiation  oncology, we will refer.     She is here today for follow up on Arimidex.  She is doing well, no complaints from therapy.  She remains active, ECOG 0.  She denies any shortness of

## 2024-09-06 NOTE — PATIENT INSTRUCTIONS
Patient Information from Today's Visit    The members of your [facility] are listed below:    Physician Provider: Edvin Pritchett, Medical Oncologist  Advanced Practice Clinician: Beverley Gomez NP  Registered Nurse: Andrew HESS RN  Nurse Navigator: Yamileth PRATER RN and Keyanna MCCANN RN  Medical Assistant: Kendra ARAUJO CMA  :Elsy MUELLER  Supportive Care Services: Carlos DE LA CRUZ LMSW    Diagnosis: Breast Cancer    Follow Up Instructions: 6 months      Treatment Summary has been discussed and given to patient:No      Current Labs:   Hospital Outpatient Visit on 09/06/2024   Component Date Value Ref Range Status    WBC 09/06/2024 5.3  4.3 - 11.1 K/uL Final    RBC 09/06/2024 4.57  4.05 - 5.2 M/uL Final    Hemoglobin 09/06/2024 13.3  11.7 - 15.4 g/dL Final    Hematocrit 09/06/2024 41.1  35.8 - 46.3 % Final    MCV 09/06/2024 89.9  82.0 - 102.0 FL Final    MCH 09/06/2024 29.1  26.1 - 32.9 PG Final    MCHC 09/06/2024 32.4  31.4 - 35.0 g/dL Final    RDW 09/06/2024 13.4  11.9 - 14.6 % Final    Platelets 09/06/2024 206  150 - 450 K/uL Final    MPV 09/06/2024 10.1  9.4 - 12.3 FL Final    nRBC 09/06/2024 0.00  0.0 - 0.2 K/uL Final    **Note: Absolute NRBC parameter is now reported with Hemogram**    Neutrophils % 09/06/2024 68  43 - 78 % Final    Lymphocytes % 09/06/2024 18  13 - 44 % Final    Monocytes % 09/06/2024 10  4.0 - 12.0 % Final    Eosinophils % 09/06/2024 3  0.5 - 7.8 % Final    Basophils % 09/06/2024 1  0.0 - 2.0 % Final    Immature Granulocytes % 09/06/2024 0  0.0 - 5.0 % Final    Neutrophils Absolute 09/06/2024 3.7  1.7 - 8.2 K/UL Final    Lymphocytes Absolute 09/06/2024 0.9  0.5 - 4.6 K/UL Final    Monocytes Absolute 09/06/2024 0.5  0.1 - 1.3 K/UL Final    Eosinophils Absolute 09/06/2024 0.1  0.0 - 0.8 K/UL Final    Basophils Absolute 09/06/2024 0.1  0.0 - 0.2 K/UL Final    Immature Granulocytes Absolute 09/06/2024 0.0  0.0 - 0.5 K/UL Final    Differential Type 09/06/2024 AUTOMATED    Final    Sodium 09/06/2024

## 2025-03-04 ENCOUNTER — HOSPITAL ENCOUNTER (OUTPATIENT)
Dept: MAMMOGRAPHY | Age: 70
Discharge: HOME | End: 2025-03-07
Attending: INTERNAL MEDICINE
Payer: MEDICARE

## 2025-03-04 DIAGNOSIS — Z12.31 ENCOUNTER FOR SCREENING MAMMOGRAM FOR BREAST CANCER: ICD-10-CM

## 2025-03-04 PROCEDURE — 77063 BREAST TOMOSYNTHESIS BI: CPT

## 2025-03-12 ENCOUNTER — RESULTS FOLLOW-UP (OUTPATIENT)
Dept: MAMMOGRAPHY | Age: 70
End: 2025-03-12

## 2025-03-12 DIAGNOSIS — C50.912 MALIGNANT NEOPLASM OF LEFT BREAST IN FEMALE, ESTROGEN RECEPTOR POSITIVE, UNSPECIFIED SITE OF BREAST: ICD-10-CM

## 2025-03-12 DIAGNOSIS — Z17.0 MALIGNANT NEOPLASM OF LEFT BREAST IN FEMALE, ESTROGEN RECEPTOR POSITIVE, UNSPECIFIED SITE OF BREAST: ICD-10-CM

## 2025-03-12 DIAGNOSIS — Z17.0 MALIGNANT NEOPLASM OF CENTRAL PORTION OF LEFT BREAST IN FEMALE, ESTROGEN RECEPTOR POSITIVE: ICD-10-CM

## 2025-03-12 DIAGNOSIS — C50.112 MALIGNANT NEOPLASM OF CENTRAL PORTION OF LEFT BREAST IN FEMALE, ESTROGEN RECEPTOR POSITIVE: ICD-10-CM

## 2025-03-12 LAB
ALBUMIN SERPL-MCNC: 4.2 G/DL (ref 3.2–4.6)
ALBUMIN/GLOB SERPL: 1.3 (ref 1–1.9)
ALP SERPL-CCNC: 92 U/L (ref 35–104)
ALT SERPL-CCNC: 20 U/L (ref 8–45)
ANION GAP SERPL CALC-SCNC: 13 MMOL/L (ref 7–16)
AST SERPL-CCNC: 24 U/L (ref 15–37)
BASOPHILS # BLD: 0.05 K/UL (ref 0–0.2)
BASOPHILS NFR BLD: 0.8 % (ref 0–2)
BILIRUB SERPL-MCNC: 0.3 MG/DL (ref 0–1.2)
BUN SERPL-MCNC: 20 MG/DL (ref 8–23)
CALCIUM SERPL-MCNC: 9.6 MG/DL (ref 8.8–10.2)
CHLORIDE SERPL-SCNC: 104 MMOL/L (ref 98–107)
CO2 SERPL-SCNC: 23 MMOL/L (ref 20–29)
CREAT SERPL-MCNC: 0.85 MG/DL (ref 0.6–1.1)
DIFFERENTIAL METHOD BLD: NORMAL
EOSINOPHIL # BLD: 0.15 K/UL (ref 0–0.8)
EOSINOPHIL NFR BLD: 2.3 % (ref 0.5–7.8)
ERYTHROCYTE [DISTWIDTH] IN BLOOD BY AUTOMATED COUNT: 13.7 % (ref 11.9–14.6)
GLOBULIN SER CALC-MCNC: 3.1 G/DL (ref 2.3–3.5)
GLUCOSE SERPL-MCNC: 86 MG/DL (ref 70–99)
HCT VFR BLD AUTO: 41.3 % (ref 35.8–46.3)
HGB BLD-MCNC: 13.5 G/DL (ref 11.7–15.4)
IMM GRANULOCYTES # BLD AUTO: 0.03 K/UL (ref 0–0.5)
IMM GRANULOCYTES NFR BLD AUTO: 0.5 % (ref 0–5)
LYMPHOCYTES # BLD: 1.45 K/UL (ref 0.5–4.6)
LYMPHOCYTES NFR BLD: 22 % (ref 13–44)
MCH RBC QN AUTO: 29 PG (ref 26.1–32.9)
MCHC RBC AUTO-ENTMCNC: 32.7 G/DL (ref 31.4–35)
MCV RBC AUTO: 88.8 FL (ref 82–102)
MONOCYTES # BLD: 0.66 K/UL (ref 0.1–1.3)
MONOCYTES NFR BLD: 10 % (ref 4–12)
NEUTS SEG # BLD: 4.25 K/UL (ref 1.7–8.2)
NEUTS SEG NFR BLD: 64.4 % (ref 43–78)
NRBC # BLD: 0 K/UL (ref 0–0.2)
PLATELET # BLD AUTO: 218 K/UL (ref 150–450)
PMV BLD AUTO: 10.7 FL (ref 9.4–12.3)
POTASSIUM SERPL-SCNC: 4.4 MMOL/L (ref 3.5–5.1)
PROT SERPL-MCNC: 7.3 G/DL (ref 6.3–8.2)
RBC # BLD AUTO: 4.65 M/UL (ref 4.05–5.2)
SODIUM SERPL-SCNC: 139 MMOL/L (ref 136–145)
WBC # BLD AUTO: 6.6 K/UL (ref 4.3–11.1)

## 2025-03-13 ENCOUNTER — OFFICE VISIT (OUTPATIENT)
Dept: ONCOLOGY | Age: 70
End: 2025-03-13
Payer: MEDICARE

## 2025-03-13 VITALS
WEIGHT: 209 LBS | HEART RATE: 82 BPM | RESPIRATION RATE: 16 BRPM | OXYGEN SATURATION: 97 % | DIASTOLIC BLOOD PRESSURE: 53 MMHG | SYSTOLIC BLOOD PRESSURE: 116 MMHG | HEIGHT: 67 IN | BODY MASS INDEX: 32.8 KG/M2 | TEMPERATURE: 97.9 F

## 2025-03-13 DIAGNOSIS — Z79.811 LONG TERM (CURRENT) USE OF AROMATASE INHIBITORS: ICD-10-CM

## 2025-03-13 DIAGNOSIS — Z17.0 MALIGNANT NEOPLASM OF CENTRAL PORTION OF LEFT BREAST IN FEMALE, ESTROGEN RECEPTOR POSITIVE (HCC): Primary | ICD-10-CM

## 2025-03-13 DIAGNOSIS — C50.112 MALIGNANT NEOPLASM OF CENTRAL PORTION OF LEFT BREAST IN FEMALE, ESTROGEN RECEPTOR POSITIVE (HCC): Primary | ICD-10-CM

## 2025-03-13 PROCEDURE — 1160F RVW MEDS BY RX/DR IN RCRD: CPT | Performed by: NURSE PRACTITIONER

## 2025-03-13 PROCEDURE — 1123F ACP DISCUSS/DSCN MKR DOCD: CPT | Performed by: NURSE PRACTITIONER

## 2025-03-13 PROCEDURE — G8417 CALC BMI ABV UP PARAM F/U: HCPCS | Performed by: NURSE PRACTITIONER

## 2025-03-13 PROCEDURE — 1036F TOBACCO NON-USER: CPT | Performed by: NURSE PRACTITIONER

## 2025-03-13 PROCEDURE — G8427 DOCREV CUR MEDS BY ELIG CLIN: HCPCS | Performed by: NURSE PRACTITIONER

## 2025-03-13 PROCEDURE — 99214 OFFICE O/P EST MOD 30 MIN: CPT | Performed by: NURSE PRACTITIONER

## 2025-03-13 PROCEDURE — G8399 PT W/DXA RESULTS DOCUMENT: HCPCS | Performed by: NURSE PRACTITIONER

## 2025-03-13 PROCEDURE — 1126F AMNT PAIN NOTED NONE PRSNT: CPT | Performed by: NURSE PRACTITIONER

## 2025-03-13 PROCEDURE — 3017F COLORECTAL CA SCREEN DOC REV: CPT | Performed by: NURSE PRACTITIONER

## 2025-03-13 PROCEDURE — 1159F MED LIST DOCD IN RCRD: CPT | Performed by: NURSE PRACTITIONER

## 2025-03-13 PROCEDURE — 1090F PRES/ABSN URINE INCON ASSESS: CPT | Performed by: NURSE PRACTITIONER

## 2025-03-13 ASSESSMENT — PATIENT HEALTH QUESTIONNAIRE - PHQ9
SUM OF ALL RESPONSES TO PHQ QUESTIONS 1-9: 0
SUM OF ALL RESPONSES TO PHQ QUESTIONS 1-9: 0
2. FEELING DOWN, DEPRESSED OR HOPELESS: NOT AT ALL
SUM OF ALL RESPONSES TO PHQ QUESTIONS 1-9: 0
SUM OF ALL RESPONSES TO PHQ QUESTIONS 1-9: 0
1. LITTLE INTEREST OR PLEASURE IN DOING THINGS: NOT AT ALL

## 2025-03-13 NOTE — PROGRESS NOTES
Ref Range Status    WBC 03/12/2025 6.6  4.3 - 11.1 K/uL Final    RBC 03/12/2025 4.65  4.05 - 5.2 M/uL Final    Hemoglobin 03/12/2025 13.5  11.7 - 15.4 g/dL Final    Hematocrit 03/12/2025 41.3  35.8 - 46.3 % Final    MCV 03/12/2025 88.8  82 - 102 FL Final    MCH 03/12/2025 29.0  26.1 - 32.9 PG Final    MCHC 03/12/2025 32.7  31.4 - 35.0 g/dL Final    RDW 03/12/2025 13.7  11.9 - 14.6 % Final    Platelets 03/12/2025 218  150 - 450 K/uL Final    MPV 03/12/2025 10.7  9.4 - 12.3 FL Final    nRBC 03/12/2025 0.00  0.0 - 0.2 K/uL Final    **Note: Absolute NRBC parameter is now reported with Hemogram**    Differential Type 03/12/2025 AUTOMATED    Final    Neutrophils % 03/12/2025 64.4  43.0 - 78.0 % Final    Lymphocytes % 03/12/2025 22.0  13.0 - 44.0 % Final    Monocytes % 03/12/2025 10.0  4.0 - 12.0 % Final    Eosinophils % 03/12/2025 2.3  0.5 - 7.8 % Final    Basophils % 03/12/2025 0.8  0.0 - 2.0 % Final    Immature Granulocytes % 03/12/2025 0.5  0.0 - 5.0 % Final    Neutrophils Absolute 03/12/2025 4.25  1.70 - 8.20 K/UL Final    Lymphocytes Absolute 03/12/2025 1.45  0.50 - 4.60 K/UL Final    Monocytes Absolute 03/12/2025 0.66  0.10 - 1.30 K/UL Final    Eosinophils Absolute 03/12/2025 0.15  0.00 - 0.80 K/UL Final    Basophils Absolute 03/12/2025 0.05  0.00 - 0.20 K/UL Final    Immature Granulocytes Absolute 03/12/2025 0.03  0.0 - 0.5 K/UL Final    Sodium 03/12/2025 139  136 - 145 mmol/L Final    Potassium 03/12/2025 4.4  3.5 - 5.1 mmol/L Final    Chloride 03/12/2025 104  98 - 107 mmol/L Final    CO2 03/12/2025 23  20 - 29 mmol/L Final    Anion Gap 03/12/2025 13  7 - 16 mmol/L Final    Glucose 03/12/2025 86  70 - 99 mg/dL Final    Comment: <70 mg/dL Consistent with, but not fully diagnostic of hypoglycemia.  100 - 125 mg/dL Impaired fasting glucose/consistent with pre-diabetes mellitus.  > 126 mg/dl Fasting glucose consistent with overt diabetes mellitus      BUN 03/12/2025 20  8 - 23 MG/DL Final    Creatinine 03/12/2025 0.85

## 2025-07-11 ENCOUNTER — HOSPITAL ENCOUNTER (OUTPATIENT)
Dept: MAMMOGRAPHY | Age: 70
Discharge: HOME | End: 2025-07-14
Attending: INTERNAL MEDICINE
Payer: MEDICARE

## 2025-07-11 DIAGNOSIS — Z79.811 LONG TERM (CURRENT) USE OF AROMATASE INHIBITORS: ICD-10-CM

## 2025-07-11 DIAGNOSIS — Z13.820 SCREENING FOR OSTEOPOROSIS: ICD-10-CM

## 2025-07-11 DIAGNOSIS — C50.112 MALIGNANT NEOPLASM OF CENTRAL PORTION OF LEFT BREAST IN FEMALE, ESTROGEN RECEPTOR POSITIVE (HCC): ICD-10-CM

## 2025-07-11 DIAGNOSIS — Z17.0 MALIGNANT NEOPLASM OF CENTRAL PORTION OF LEFT BREAST IN FEMALE, ESTROGEN RECEPTOR POSITIVE (HCC): ICD-10-CM

## 2025-07-11 PROCEDURE — 77080 DXA BONE DENSITY AXIAL: CPT

## 2025-08-08 ENCOUNTER — HOSPITAL ENCOUNTER (OUTPATIENT)
Dept: MRI IMAGING | Age: 70
Discharge: HOME | End: 2025-08-11
Attending: INTERNAL MEDICINE
Payer: MEDICARE

## 2025-08-08 DIAGNOSIS — C50.112 MALIGNANT NEOPLASM OF CENTRAL PORTION OF LEFT BREAST IN FEMALE, ESTROGEN RECEPTOR POSITIVE (HCC): ICD-10-CM

## 2025-08-08 DIAGNOSIS — Z17.0 MALIGNANT NEOPLASM OF CENTRAL PORTION OF LEFT BREAST IN FEMALE, ESTROGEN RECEPTOR POSITIVE (HCC): ICD-10-CM

## 2025-08-08 DIAGNOSIS — R92.30 DENSE BREAST TISSUE: ICD-10-CM

## 2025-08-08 DIAGNOSIS — C50.912 MALIGNANT NEOPLASM OF LEFT BREAST IN FEMALE, ESTROGEN RECEPTOR POSITIVE, UNSPECIFIED SITE OF BREAST (HCC): ICD-10-CM

## 2025-08-08 DIAGNOSIS — Z17.0 MALIGNANT NEOPLASM OF LEFT BREAST IN FEMALE, ESTROGEN RECEPTOR POSITIVE, UNSPECIFIED SITE OF BREAST (HCC): ICD-10-CM

## 2025-08-08 PROCEDURE — 6360000004 HC RX CONTRAST MEDICATION: Performed by: INTERNAL MEDICINE

## 2025-08-08 PROCEDURE — C8908 MRI W/O FOL W/CONT, BREAST,: HCPCS

## 2025-08-08 PROCEDURE — A9579 GAD-BASE MR CONTRAST NOS,1ML: HCPCS | Performed by: INTERNAL MEDICINE

## 2025-08-08 RX ORDER — GADOTERIDOL 279.3 MG/ML
18 INJECTION INTRAVENOUS
Status: COMPLETED | OUTPATIENT
Start: 2025-08-08 | End: 2025-08-08

## 2025-08-08 RX ADMIN — GADOTERIDOL 18 ML: 279.3 INJECTION, SOLUTION INTRAVENOUS at 09:44

## (undated) DEVICE — GAUZE,SPONGE,4"X4",16PLY,STRL,LF,10/TRAY: Brand: MEDLINE

## (undated) DEVICE — MINOR SPLIT GENERAL: Brand: MEDLINE INDUSTRIES, INC.

## (undated) DEVICE — WIRE CUTTER, STERILE (WCS144): Brand: CENTURION MEDICAL PRODUCTS CORP

## (undated) DEVICE — APPLIER CLP L9.375IN APER 2.1MM CLS L3.8MM 20 SM TI CLP

## (undated) DEVICE — NEEDLE HYPO 21GA L1.5IN INTRAMUSCULAR S STL LATCH BVL UP

## (undated) DEVICE — SUTURE VCRL SZ 3-0 L27IN ABSRB UD L26MM SH 1/2 CIR J416H

## (undated) DEVICE — SUT SLK 2-0SH 30IN BLK --

## (undated) DEVICE — DRAPE,TOP,102X53,STERILE: Brand: MEDLINE

## (undated) DEVICE — REM POLYHESIVE ADULT PATIENT RETURN ELECTRODE: Brand: VALLEYLAB

## (undated) DEVICE — GARMENT,MEDLINE,DVT,INT,CALF,MED, GEN2: Brand: MEDLINE

## (undated) DEVICE — SOLUTION IV 1000ML 0.9% SOD CHL

## (undated) DEVICE — COVER PRB L11.9CM TAPR L3.8X61CM TRNSPAR SFT PLIABLE

## (undated) DEVICE — 2000CC GUARDIAN II: Brand: GUARDIAN

## (undated) DEVICE — BUTTON SWITCH PENCIL BLADE ELECTRODE, HOLSTER: Brand: EDGE

## (undated) DEVICE — SUTURE MCRYL SZ 4-0 L27IN ABSRB UD L19MM PS-2 1/2 CIR PRIM Y426H

## (undated) DEVICE — CONTAINER COLL/TRNSPRT BX BRST -- E-Z-EM CAT 8390

## (undated) DEVICE — STRIP,CLOSURE,WOUND,MEDI-STRIP,1/2X4: Brand: MEDLINE

## (undated) DEVICE — PREP SKN CHLRAPRP APL 26ML STR --

## (undated) DEVICE — SHEET, DRAPE, SPLIT, STERILE: Brand: MEDLINE

## (undated) DEVICE — APPLIER CLP L9.38IN M LIG TI DISP STR RNG HNDL LIGACLP

## (undated) DEVICE — CONTAINER,SPECIMEN,O.R.STRL,4.5OZ: Brand: MEDLINE

## (undated) DEVICE — MASTISOL ADHESIVE LIQ 2/3ML